# Patient Record
Sex: MALE | Race: WHITE | NOT HISPANIC OR LATINO | Employment: OTHER | URBAN - METROPOLITAN AREA
[De-identification: names, ages, dates, MRNs, and addresses within clinical notes are randomized per-mention and may not be internally consistent; named-entity substitution may affect disease eponyms.]

---

## 2017-01-01 ENCOUNTER — TELEPHONE (OUTPATIENT)
Dept: ONCOLOGY | Facility: CLINIC | Age: 77
End: 2017-01-01

## 2017-01-01 ENCOUNTER — APPOINTMENT (OUTPATIENT)
Dept: GENERAL RADIOLOGY | Facility: HOSPITAL | Age: 77
End: 2017-01-01

## 2017-01-01 ENCOUNTER — TELEPHONE (OUTPATIENT)
Dept: PULMONOLOGY | Facility: CLINIC | Age: 77
End: 2017-01-01

## 2017-01-01 ENCOUNTER — OFFICE VISIT (OUTPATIENT)
Dept: ONCOLOGY | Facility: CLINIC | Age: 77
End: 2017-01-01

## 2017-01-01 ENCOUNTER — RESULTS ENCOUNTER (OUTPATIENT)
Dept: ONCOLOGY | Facility: CLINIC | Age: 77
End: 2017-01-01

## 2017-01-01 ENCOUNTER — APPOINTMENT (OUTPATIENT)
Dept: CT IMAGING | Facility: HOSPITAL | Age: 77
End: 2017-01-01

## 2017-01-01 ENCOUNTER — APPOINTMENT (OUTPATIENT)
Dept: CARDIOLOGY | Facility: HOSPITAL | Age: 77
End: 2017-01-01
Attending: HOSPITALIST

## 2017-01-01 ENCOUNTER — HOSPITAL ENCOUNTER (OUTPATIENT)
Dept: CT IMAGING | Facility: HOSPITAL | Age: 77
Discharge: HOME | End: 2017-06-02
Attending: INTERNAL MEDICINE | Admitting: INTERNAL MEDICINE

## 2017-01-01 ENCOUNTER — HOSPITAL ENCOUNTER (OUTPATIENT)
Dept: CARDIOLOGY | Facility: HOSPITAL | Age: 77
Discharge: HOME | End: 2017-05-30
Admitting: PHYSICIAN ASSISTANT

## 2017-01-01 ENCOUNTER — INFUSION (OUTPATIENT)
Dept: ONCOLOGY | Facility: HOSPITAL | Age: 77
End: 2017-01-01

## 2017-01-01 ENCOUNTER — LAB (OUTPATIENT)
Dept: LAB | Facility: HOSPITAL | Age: 77
End: 2017-01-01

## 2017-01-01 ENCOUNTER — OFFICE VISIT (OUTPATIENT)
Dept: CARDIAC SURGERY | Facility: CLINIC | Age: 77
End: 2017-01-01

## 2017-01-01 ENCOUNTER — APPOINTMENT (OUTPATIENT)
Dept: CARDIOLOGY | Facility: HOSPITAL | Age: 77
End: 2017-01-01
Attending: INTERNAL MEDICINE

## 2017-01-01 ENCOUNTER — OFFICE VISIT (OUTPATIENT)
Dept: PULMONOLOGY | Facility: CLINIC | Age: 77
End: 2017-01-01

## 2017-01-01 ENCOUNTER — HOSPITAL ENCOUNTER (INPATIENT)
Facility: HOSPITAL | Age: 77
LOS: 5 days | Discharge: HOME | End: 2017-04-24
Attending: HOSPITALIST | Admitting: HOSPITALIST

## 2017-01-01 ENCOUNTER — DOCUMENTATION (OUTPATIENT)
Dept: ONCOLOGY | Facility: CLINIC | Age: 77
End: 2017-01-01

## 2017-01-01 VITALS
WEIGHT: 123 LBS | BODY MASS INDEX: 16.66 KG/M2 | SYSTOLIC BLOOD PRESSURE: 90 MMHG | RESPIRATION RATE: 24 BRPM | TEMPERATURE: 98.1 F | DIASTOLIC BLOOD PRESSURE: 56 MMHG | OXYGEN SATURATION: 79 % | HEART RATE: 85 BPM | HEIGHT: 72 IN

## 2017-01-01 VITALS
HEIGHT: 72 IN | DIASTOLIC BLOOD PRESSURE: 54 MMHG | WEIGHT: 123 LBS | SYSTOLIC BLOOD PRESSURE: 100 MMHG | RESPIRATION RATE: 16 BRPM | OXYGEN SATURATION: 97 % | TEMPERATURE: 97.9 F | BODY MASS INDEX: 16.66 KG/M2 | HEART RATE: 76 BPM

## 2017-01-01 VITALS
OXYGEN SATURATION: 77 % | DIASTOLIC BLOOD PRESSURE: 42 MMHG | BODY MASS INDEX: 17.61 KG/M2 | HEART RATE: 104 BPM | RESPIRATION RATE: 22 BRPM | WEIGHT: 130 LBS | HEIGHT: 72 IN | TEMPERATURE: 98.1 F | SYSTOLIC BLOOD PRESSURE: 91 MMHG

## 2017-01-01 VITALS
HEIGHT: 72 IN | DIASTOLIC BLOOD PRESSURE: 66 MMHG | WEIGHT: 130.07 LBS | OXYGEN SATURATION: 97 % | TEMPERATURE: 98.1 F | BODY MASS INDEX: 17.62 KG/M2 | HEART RATE: 84 BPM | RESPIRATION RATE: 22 BRPM | SYSTOLIC BLOOD PRESSURE: 94 MMHG

## 2017-01-01 VITALS
OXYGEN SATURATION: 89 % | WEIGHT: 127 LBS | BODY MASS INDEX: 17.2 KG/M2 | HEIGHT: 72 IN | SYSTOLIC BLOOD PRESSURE: 100 MMHG | DIASTOLIC BLOOD PRESSURE: 70 MMHG | HEART RATE: 75 BPM | TEMPERATURE: 97.9 F

## 2017-01-01 VITALS
DIASTOLIC BLOOD PRESSURE: 78 MMHG | WEIGHT: 127.6 LBS | OXYGEN SATURATION: 98 % | HEART RATE: 90 BPM | TEMPERATURE: 97.5 F | RESPIRATION RATE: 18 BRPM | HEIGHT: 71 IN | BODY MASS INDEX: 17.86 KG/M2 | SYSTOLIC BLOOD PRESSURE: 118 MMHG

## 2017-01-01 VITALS
RESPIRATION RATE: 18 BRPM | SYSTOLIC BLOOD PRESSURE: 75 MMHG | TEMPERATURE: 98.4 F | DIASTOLIC BLOOD PRESSURE: 40 MMHG | BODY MASS INDEX: 16.8 KG/M2 | HEIGHT: 72 IN | HEART RATE: 77 BPM | WEIGHT: 124 LBS

## 2017-01-01 VITALS
HEART RATE: 86 BPM | TEMPERATURE: 97.5 F | SYSTOLIC BLOOD PRESSURE: 92 MMHG | OXYGEN SATURATION: 85 % | WEIGHT: 122 LBS | DIASTOLIC BLOOD PRESSURE: 53 MMHG | HEIGHT: 72 IN | BODY MASS INDEX: 16.52 KG/M2

## 2017-01-01 VITALS
DIASTOLIC BLOOD PRESSURE: 52 MMHG | HEIGHT: 72 IN | WEIGHT: 127 LBS | RESPIRATION RATE: 20 BRPM | TEMPERATURE: 98 F | BODY MASS INDEX: 17.2 KG/M2 | HEART RATE: 80 BPM | SYSTOLIC BLOOD PRESSURE: 92 MMHG

## 2017-01-01 VITALS
OXYGEN SATURATION: 63 % | RESPIRATION RATE: 16 BRPM | HEART RATE: 84 BPM | HEIGHT: 72 IN | BODY MASS INDEX: 17.61 KG/M2 | DIASTOLIC BLOOD PRESSURE: 74 MMHG | WEIGHT: 130 LBS | TEMPERATURE: 97.8 F | SYSTOLIC BLOOD PRESSURE: 120 MMHG

## 2017-01-01 VITALS
OXYGEN SATURATION: 89 % | BODY MASS INDEX: 16.52 KG/M2 | DIASTOLIC BLOOD PRESSURE: 54 MMHG | HEIGHT: 72 IN | WEIGHT: 122 LBS | HEART RATE: 72 BPM | TEMPERATURE: 97.2 F | RESPIRATION RATE: 20 BRPM | SYSTOLIC BLOOD PRESSURE: 101 MMHG

## 2017-01-01 VITALS
HEIGHT: 72 IN | RESPIRATION RATE: 20 BRPM | HEART RATE: 83 BPM | SYSTOLIC BLOOD PRESSURE: 86 MMHG | BODY MASS INDEX: 17.61 KG/M2 | WEIGHT: 130 LBS | TEMPERATURE: 97.6 F | DIASTOLIC BLOOD PRESSURE: 46 MMHG

## 2017-01-01 VITALS
HEART RATE: 90 BPM | BODY MASS INDEX: 15.19 KG/M2 | DIASTOLIC BLOOD PRESSURE: 54 MMHG | SYSTOLIC BLOOD PRESSURE: 114 MMHG | WEIGHT: 112 LBS | RESPIRATION RATE: 16 BRPM | TEMPERATURE: 98 F

## 2017-01-01 VITALS — WEIGHT: 120 LBS | HEIGHT: 72 IN | BODY MASS INDEX: 16.25 KG/M2

## 2017-01-01 DIAGNOSIS — C93.10 CHRONIC MYELOMONOCYTIC LEUKEMIA NOT HAVING ACHIEVED REMISSION (HCC): Primary | ICD-10-CM

## 2017-01-01 DIAGNOSIS — D46.20 MYELODYSPLASIA, LOW GRADE (HCC): ICD-10-CM

## 2017-01-01 DIAGNOSIS — D46.20 MYELODYSPLASIA, LOW GRADE (HCC): Primary | ICD-10-CM

## 2017-01-01 DIAGNOSIS — R62.7 FTT (FAILURE TO THRIVE) IN ADULT: ICD-10-CM

## 2017-01-01 DIAGNOSIS — Z74.09 IMPAIRED MOBILITY AND ADLS: ICD-10-CM

## 2017-01-01 DIAGNOSIS — J43.2 CENTRILOBULAR EMPHYSEMA (HCC): Primary | ICD-10-CM

## 2017-01-01 DIAGNOSIS — J43.2 CENTRILOBULAR EMPHYSEMA (HCC): ICD-10-CM

## 2017-01-01 DIAGNOSIS — R09.02 HYPOXIA: ICD-10-CM

## 2017-01-01 DIAGNOSIS — C93.10 CHRONIC MYELOMONOCYTIC LEUKEMIA NOT HAVING ACHIEVED REMISSION (HCC): ICD-10-CM

## 2017-01-01 DIAGNOSIS — R06.02 SHORTNESS OF BREATH: ICD-10-CM

## 2017-01-01 DIAGNOSIS — Z74.09 IMPAIRED FUNCTIONAL MOBILITY, BALANCE, GAIT, AND ENDURANCE: Primary | ICD-10-CM

## 2017-01-01 DIAGNOSIS — I31.39 PERICARDIAL EFFUSION: Primary | ICD-10-CM

## 2017-01-01 DIAGNOSIS — R09.02 HYPOXIA: Primary | ICD-10-CM

## 2017-01-01 DIAGNOSIS — I31.4 PERICARDIAL EFFUSION WITH CARDIAC TAMPONADE: ICD-10-CM

## 2017-01-01 DIAGNOSIS — Z72.0 TOBACCO ABUSE: ICD-10-CM

## 2017-01-01 DIAGNOSIS — I31.39 PERICARDIAL EFFUSION WITH CARDIAC TAMPONADE: ICD-10-CM

## 2017-01-01 DIAGNOSIS — C93.12 CHRONIC MYELOMONOCYTIC LEUKEMIA, IN RELAPSE (HCC): ICD-10-CM

## 2017-01-01 DIAGNOSIS — Z78.9 IMPAIRED MOBILITY AND ADLS: ICD-10-CM

## 2017-01-01 DIAGNOSIS — D50.0 IRON DEFICIENCY ANEMIA DUE TO CHRONIC BLOOD LOSS: ICD-10-CM

## 2017-01-01 DIAGNOSIS — I31.39 PERICARDIAL EFFUSION: ICD-10-CM

## 2017-01-01 DIAGNOSIS — D64.9 ANEMIA, UNSPECIFIED TYPE: ICD-10-CM

## 2017-01-01 DIAGNOSIS — Z87.891 PERSONAL HISTORY OF SMOKING: Primary | ICD-10-CM

## 2017-01-01 DIAGNOSIS — D69.6 THROMBOCYTOPENIA (HCC): Primary | ICD-10-CM

## 2017-01-01 DIAGNOSIS — I31.39 PERICARDIAL EFFUSION WITH CARDIAC TAMPONADE: Primary | ICD-10-CM

## 2017-01-01 DIAGNOSIS — Z87.891 PERSONAL HISTORY OF TOBACCO USE, PRESENTING HAZARDS TO HEALTH: Primary | ICD-10-CM

## 2017-01-01 DIAGNOSIS — D64.9 ANEMIA, UNSPECIFIED TYPE: Primary | ICD-10-CM

## 2017-01-01 DIAGNOSIS — Z87.891 PERSONAL HISTORY OF TOBACCO USE, PRESENTING HAZARDS TO HEALTH: ICD-10-CM

## 2017-01-01 DIAGNOSIS — R53.82 CHRONIC FATIGUE: ICD-10-CM

## 2017-01-01 DIAGNOSIS — I31.4 PERICARDIAL EFFUSION WITH CARDIAC TAMPONADE: Primary | ICD-10-CM

## 2017-01-01 DIAGNOSIS — C93.11 CHRONIC MYELOMONOCYTIC LEUKEMIA IN REMISSION (HCC): Primary | ICD-10-CM

## 2017-01-01 LAB
ABO + RH BLD: NORMAL
ABO + RH BLD: NORMAL
ABO GROUP BLD: NORMAL
ABO GROUP BLD: NORMAL
ALBUMIN SERPL-MCNC: 3.5 G/DL (ref 3.2–4.8)
ALBUMIN SERPL-MCNC: 3.8 G/DL (ref 3.2–4.8)
ALBUMIN/GLOB SERPL: 1.1 G/DL (ref 1.5–2.5)
ALBUMIN/GLOB SERPL: 1.2 G/DL (ref 1.5–2.5)
ALP SERPL-CCNC: 58 U/L (ref 25–100)
ALP SERPL-CCNC: 66 U/L (ref 25–100)
ALT SERPL W P-5'-P-CCNC: 25 U/L (ref 7–40)
ALT SERPL W P-5'-P-CCNC: 36 U/L (ref 7–40)
ANION GAP SERPL CALCULATED.3IONS-SCNC: 14 MMOL/L (ref 3–11)
ANION GAP SERPL CALCULATED.3IONS-SCNC: 4 MMOL/L (ref 3–11)
ANION GAP SERPL CALCULATED.3IONS-SCNC: 4 MMOL/L (ref 3–11)
ANION GAP SERPL CALCULATED.3IONS-SCNC: 5 MMOL/L (ref 3–11)
APTT PPP: 35 SECONDS (ref 24–31)
AST SERPL-CCNC: 38 U/L (ref 0–33)
AST SERPL-CCNC: 48 U/L (ref 0–33)
BASOPHILS # BLD AUTO: 0 X10E3/UL (ref 0–0.2)
BASOPHILS # BLD AUTO: 0.2 X10E3/UL (ref 0–0.2)
BASOPHILS # BLD AUTO: 0.4 X10E3/UL (ref 0–0.2)
BASOPHILS # BLD MANUAL: 0 10*3/MM3 (ref 0–0.2)
BASOPHILS NFR BLD AUTO: 0 %
BASOPHILS NFR BLD AUTO: 0 % (ref 0–1)
BASOPHILS NFR BLD AUTO: 1 %
BASOPHILS NFR BLD AUTO: 2 %
BH BB BLOOD EXPIRATION DATE: NORMAL
BH BB BLOOD EXPIRATION DATE: NORMAL
BH BB BLOOD TYPE BARCODE: 7300
BH BB BLOOD TYPE BARCODE: 7300
BH BB DISPENSE STATUS: NORMAL
BH BB DISPENSE STATUS: NORMAL
BH BB PRODUCT CODE: NORMAL
BH BB PRODUCT CODE: NORMAL
BH BB UNIT NUMBER: NORMAL
BH BB UNIT NUMBER: NORMAL
BH CV ECHO MEAS - AI DEC SLOPE: 284 CM/SEC^2
BH CV ECHO MEAS - AI DEC SLOPE: 321.1 CM/SEC^2
BH CV ECHO MEAS - AI MAX PG: 57.6 MMHG
BH CV ECHO MEAS - AI MAX PG: 58.7 MMHG
BH CV ECHO MEAS - AI MAX VEL: 379 CM/SEC
BH CV ECHO MEAS - AI MAX VEL: 382.9 CM/SEC
BH CV ECHO MEAS - AI P1/2T: 349.3 MSEC
BH CV ECHO MEAS - AI P1/2T: 390.9 MSEC
BH CV ECHO MEAS - AO ROOT AREA (BSA CORRECTED): 1.6
BH CV ECHO MEAS - AO ROOT AREA (BSA CORRECTED): 2
BH CV ECHO MEAS - AO ROOT AREA: 6.6 CM^2
BH CV ECHO MEAS - AO ROOT AREA: 9.7 CM^2
BH CV ECHO MEAS - AO ROOT DIAM: 2.9 CM
BH CV ECHO MEAS - AO ROOT DIAM: 3.5 CM
BH CV ECHO MEAS - BSA(HAYCOCK): 1.7 M^2
BH CV ECHO MEAS - BSA: 1.8 M^2
BH CV ECHO MEAS - BZI_BMI: 17.5 KILOGRAMS/M^2
BH CV ECHO MEAS - BZI_BMI: 17.6 KILOGRAMS/M^2
BH CV ECHO MEAS - BZI_BMI: 17.6 KILOGRAMS/M^2
BH CV ECHO MEAS - BZI_METRIC_HEIGHT: 182.9 CM
BH CV ECHO MEAS - BZI_METRIC_WEIGHT: 58.5 KG
BH CV ECHO MEAS - BZI_METRIC_WEIGHT: 59 KG
BH CV ECHO MEAS - BZI_METRIC_WEIGHT: 59 KG
BH CV ECHO MEAS - CONTRAST EF (2CH): 47.1 ML/M^2
BH CV ECHO MEAS - CONTRAST EF (2CH): 52.1 ML/M^2
BH CV ECHO MEAS - CONTRAST EF 4CH: 50 ML/M^2
BH CV ECHO MEAS - CONTRAST EF 4CH: 64.8 ML/M^2
BH CV ECHO MEAS - EDV(CUBED): 71.1 ML
BH CV ECHO MEAS - EDV(CUBED): 95.4 ML
BH CV ECHO MEAS - EDV(MOD-SP2): 87 ML
BH CV ECHO MEAS - EDV(MOD-SP2): 94 ML
BH CV ECHO MEAS - EDV(MOD-SP4): 105 ML
BH CV ECHO MEAS - EDV(MOD-SP4): 96 ML
BH CV ECHO MEAS - EDV(TEICH): 76.1 ML
BH CV ECHO MEAS - EDV(TEICH): 95.9 ML
BH CV ECHO MEAS - EF(CUBED): 62.1 %
BH CV ECHO MEAS - EF(CUBED): 70 %
BH CV ECHO MEAS - EF(MOD-SP2): 47.1 %
BH CV ECHO MEAS - EF(MOD-SP2): 52.1 %
BH CV ECHO MEAS - EF(MOD-SP4): 50 %
BH CV ECHO MEAS - EF(MOD-SP4): 64 %
BH CV ECHO MEAS - EF(TEICH): 54.1 %
BH CV ECHO MEAS - EF(TEICH): 61.7 %
BH CV ECHO MEAS - ESV(CUBED): 26.9 ML
BH CV ECHO MEAS - ESV(CUBED): 28.7 ML
BH CV ECHO MEAS - ESV(MOD-SP2): 45 ML
BH CV ECHO MEAS - ESV(MOD-SP2): 46 ML
BH CV ECHO MEAS - ESV(MOD-SP4): 37 ML
BH CV ECHO MEAS - ESV(MOD-SP4): 48 ML
BH CV ECHO MEAS - ESV(TEICH): 34.9 ML
BH CV ECHO MEAS - ESV(TEICH): 36.7 ML
BH CV ECHO MEAS - FS: 27.7 %
BH CV ECHO MEAS - FS: 33 %
BH CV ECHO MEAS - IVS/LVPW: 0.9
BH CV ECHO MEAS - IVS/LVPW: 1.1
BH CV ECHO MEAS - IVSD: 0.84 CM
BH CV ECHO MEAS - IVSD: 1 CM
BH CV ECHO MEAS - LA DIMENSION: 4.2 CM
BH CV ECHO MEAS - LA DIMENSION: 4.3 CM
BH CV ECHO MEAS - LA/AO: 1.2
BH CV ECHO MEAS - LA/AO: 1.4
BH CV ECHO MEAS - LAT PEAK E' VEL: 12.3 CM/SEC
BH CV ECHO MEAS - LV DIASTOLIC VOL/BSA (35-75): 54.1 ML/M^2
BH CV ECHO MEAS - LV DIASTOLIC VOL/BSA (35-75): 59.4 ML/M^2
BH CV ECHO MEAS - LV MASS(C)D: 113.4 GRAMS
BH CV ECHO MEAS - LV MASS(C)D: 146 GRAMS
BH CV ECHO MEAS - LV MASS(C)DI: 63.9 GRAMS/M^2
BH CV ECHO MEAS - LV MASS(C)DI: 82.6 GRAMS/M^2
BH CV ECHO MEAS - LV MAX PG: 3.3 MMHG
BH CV ECHO MEAS - LV MEAN PG: 1.3 MMHG
BH CV ECHO MEAS - LV SYSTOLIC VOL/BSA (12-30): 20.9 ML/M^2
BH CV ECHO MEAS - LV SYSTOLIC VOL/BSA (12-30): 27.1 ML/M^2
BH CV ECHO MEAS - LV V1 MAX: 90.3 CM/SEC
BH CV ECHO MEAS - LV V1 MEAN: 50.9 CM/SEC
BH CV ECHO MEAS - LV V1 VTI: 18.5 CM
BH CV ECHO MEAS - LVIDD: 4.1 CM
BH CV ECHO MEAS - LVIDD: 4.6 CM
BH CV ECHO MEAS - LVIDS: 3 CM
BH CV ECHO MEAS - LVIDS: 3.1 CM
BH CV ECHO MEAS - LVLD AP2: 6.6 CM
BH CV ECHO MEAS - LVLD AP2: 7.3 CM
BH CV ECHO MEAS - LVLD AP4: 7.1 CM
BH CV ECHO MEAS - LVLD AP4: 7.4 CM
BH CV ECHO MEAS - LVLS AP2: 6.6 CM
BH CV ECHO MEAS - LVLS AP2: 6.8 CM
BH CV ECHO MEAS - LVLS AP4: 6.1 CM
BH CV ECHO MEAS - LVLS AP4: 6.7 CM
BH CV ECHO MEAS - LVOT AREA (M): 3.5 CM^2
BH CV ECHO MEAS - LVOT AREA: 3.6 CM^2
BH CV ECHO MEAS - LVOT DIAM: 2.1 CM
BH CV ECHO MEAS - LVPWD: 0.93 CM
BH CV ECHO MEAS - LVPWD: 1 CM
BH CV ECHO MEAS - MED PEAK E' VEL: 5.9 CM/SEC
BH CV ECHO MEAS - MV A MAX VEL: 80 CM/SEC
BH CV ECHO MEAS - MV A MAX VEL: 82.9 CM/SEC
BH CV ECHO MEAS - MV DEC TIME: 0.18 SEC
BH CV ECHO MEAS - MV E MAX VEL: 96.7 CM/SEC
BH CV ECHO MEAS - MV E MAX VEL: 99.7 CM/SEC
BH CV ECHO MEAS - MV E/A: 1.2
BH CV ECHO MEAS - MV E/A: 1.2
BH CV ECHO MEAS - PA ACC SLOPE: 1007 CM/SEC^2
BH CV ECHO MEAS - PA ACC SLOPE: 694 CM/SEC^2
BH CV ECHO MEAS - PA ACC TIME: 0.1 SEC
BH CV ECHO MEAS - PA ACC TIME: 0.12 SEC
BH CV ECHO MEAS - PA PR(ACCEL): 23.5 MMHG
BH CV ECHO MEAS - PA PR(ACCEL): 33.8 MMHG
BH CV ECHO MEAS - PI END-D VEL: 137.1 CM/SEC
BH CV ECHO MEAS - RAP SYSTOLE: 3 MMHG
BH CV ECHO MEAS - RAP SYSTOLE: 8 MMHG
BH CV ECHO MEAS - RVDD: 3.3 CM
BH CV ECHO MEAS - RVDD: 3.3 CM
BH CV ECHO MEAS - RVSP: 39 MMHG
BH CV ECHO MEAS - RVSP: 48.4 MMHG
BH CV ECHO MEAS - SI(CUBED): 24.9 ML/M^2
BH CV ECHO MEAS - SI(CUBED): 37.8 ML/M^2
BH CV ECHO MEAS - SI(LVOT): 37.5 ML/M^2
BH CV ECHO MEAS - SI(MOD-SP2): 23.2 ML/M^2
BH CV ECHO MEAS - SI(MOD-SP2): 27.6 ML/M^2
BH CV ECHO MEAS - SI(MOD-SP4): 27.1 ML/M^2
BH CV ECHO MEAS - SI(MOD-SP4): 38.5 ML/M^2
BH CV ECHO MEAS - SI(TEICH): 23.2 ML/M^2
BH CV ECHO MEAS - SI(TEICH): 33.4 ML/M^2
BH CV ECHO MEAS - SV(CUBED): 44.2 ML
BH CV ECHO MEAS - SV(CUBED): 66.8 ML
BH CV ECHO MEAS - SV(LVOT): 66.6 ML
BH CV ECHO MEAS - SV(MOD-SP2): 41 ML
BH CV ECHO MEAS - SV(MOD-SP2): 49 ML
BH CV ECHO MEAS - SV(MOD-SP4): 48 ML
BH CV ECHO MEAS - SV(MOD-SP4): 68 ML
BH CV ECHO MEAS - SV(TEICH): 41.2 ML
BH CV ECHO MEAS - SV(TEICH): 59.1 ML
BH CV ECHO MEAS - TAPSE (>1.6): 1.1 CM2
BH CV ECHO MEAS - TR MAX VEL: 299.3 CM/SEC
BH CV ECHO MEAS - TR MAX VEL: 317.6 CM/SEC
BH CV VAS BP RIGHT ARM: NORMAL MMHG
BH CV XLRA - RV BASE: 3.9 CM
BH CV XLRA - RV BASE: 4 CM
BH CV XLRA - RV LENGTH: 5.9 CM
BH CV XLRA - RV LENGTH: 7.2 CM
BH CV XLRA - RV MID: 3.4 CM
BH CV XLRA - RV MID: 3.4 CM
BILIRUB CONJ SERPL-MCNC: 0.2 MG/DL (ref 0–0.2)
BILIRUB SERPL-MCNC: 0.5 MG/DL (ref 0.3–1.2)
BILIRUB SERPL-MCNC: 0.8 MG/DL (ref 0.3–1.2)
BLASTS NFR BLD MANUAL: 1 % (ref 0–0)
BLASTS NFR BLD MANUAL: 2 % (ref 0–0)
BLASTS NFR BLD: 1 %
BLASTS NFR BLD: 2 %
BLASTS NFR BLD: 3 %
BLASTS NFR BLD: 4 %
BLD GP AB SCN SERPL QL: NEGATIVE
BNP SERPL-MCNC: 441 PG/ML (ref 0–100)
BUN BLD-MCNC: 17 MG/DL (ref 9–23)
BUN BLD-MCNC: 17 MG/DL (ref 9–23)
BUN BLD-MCNC: 22 MG/DL (ref 9–23)
BUN BLD-MCNC: 22 MG/DL (ref 9–23)
BUN/CREAT SERPL: 12.1 (ref 7–25)
BUN/CREAT SERPL: 13.8 (ref 7–25)
BUN/CREAT SERPL: 14.2 (ref 7–25)
BUN/CREAT SERPL: 15.7 (ref 7–25)
CALCIUM SPEC-SCNC: 8.8 MG/DL (ref 8.7–10.4)
CALCIUM SPEC-SCNC: 8.9 MG/DL (ref 8.7–10.4)
CALCIUM SPEC-SCNC: 8.9 MG/DL (ref 8.7–10.4)
CALCIUM SPEC-SCNC: 9.4 MG/DL (ref 8.7–10.4)
CHLORIDE SERPL-SCNC: 109 MMOL/L (ref 99–109)
CHLORIDE SERPL-SCNC: 109 MMOL/L (ref 99–109)
CHLORIDE SERPL-SCNC: 110 MMOL/L (ref 99–109)
CHLORIDE SERPL-SCNC: 111 MMOL/L (ref 99–109)
CO2 SERPL-SCNC: 15 MMOL/L (ref 20–31)
CO2 SERPL-SCNC: 23 MMOL/L (ref 20–31)
CO2 SERPL-SCNC: 23 MMOL/L (ref 20–31)
CO2 SERPL-SCNC: 24 MMOL/L (ref 20–31)
CORTIS SERPL-MCNC: 11.6 MCG/DL
CREAT BLD-MCNC: 1.2 MG/DL (ref 0.6–1.3)
CREAT BLD-MCNC: 1.4 MG/DL (ref 0.6–1.3)
CREAT BLD-MCNC: 1.4 MG/DL (ref 0.6–1.3)
CREAT BLD-MCNC: 1.6 MG/DL (ref 0.6–1.3)
CROSSMATCH INTERPRETATION: NORMAL
CROSSMATCH INTERPRETATION: NORMAL
CYTOLOGIST CVX/VAG CYTO: NORMAL
D DIMER PPP FEU-MCNC: 1.45 MG/L (FEU) (ref 0–0.5)
D-LACTATE SERPL-SCNC: 1.4 MMOL/L (ref 0.5–2)
DAT POLY-SP REAG RBC QL: POSITIVE
DEPRECATED RDW RBC AUTO: 62.1 FL (ref 37–54)
DEPRECATED RDW RBC AUTO: 64.9 FL (ref 37–54)
DEPRECATED RDW RBC AUTO: 66.3 FL (ref 37–54)
DEPRECATED RDW RBC AUTO: 66.9 FL (ref 37–54)
DEPRECATED RDW RBC AUTO: 69.9 FL (ref 37–54)
DEPRECATED RDW RBC AUTO: 73.2 FL (ref 37–54)
EOSINOPHIL # BLD AUTO: 0 X10E3/UL (ref 0–0.4)
EOSINOPHIL # BLD AUTO: 0.1 X10E3/UL (ref 0–0.4)
EOSINOPHIL # BLD AUTO: 1.1 X10E3/UL (ref 0–0.4)
EOSINOPHIL # BLD MANUAL: 0 10*3/MM3 (ref 0.1–0.3)
EOSINOPHIL # BLD MANUAL: 0.35 10*3/MM3 (ref 0.1–0.3)
EOSINOPHIL NFR BLD AUTO: 0 %
EOSINOPHIL NFR BLD AUTO: 1 %
EOSINOPHIL NFR BLD AUTO: 2 %
EOSINOPHIL NFR BLD MANUAL: 0 % (ref 0–3)
EOSINOPHIL NFR BLD MANUAL: 1 % (ref 0–3)
EPO SERPL-ACNC: 7.2 MIU/ML (ref 2.6–18.5)
ERYTHROCYTE [DISTWIDTH] IN BLOOD BY AUTOMATED COUNT: 17.9 % (ref 12.3–15.4)
ERYTHROCYTE [DISTWIDTH] IN BLOOD BY AUTOMATED COUNT: 18 % (ref 12.3–15.4)
ERYTHROCYTE [DISTWIDTH] IN BLOOD BY AUTOMATED COUNT: 18.2 % (ref 12.3–15.4)
ERYTHROCYTE [DISTWIDTH] IN BLOOD BY AUTOMATED COUNT: 18.2 % (ref 12.3–15.4)
ERYTHROCYTE [DISTWIDTH] IN BLOOD BY AUTOMATED COUNT: 18.4 % (ref 12.3–15.4)
ERYTHROCYTE [DISTWIDTH] IN BLOOD BY AUTOMATED COUNT: 18.5 % (ref 12.3–15.4)
ERYTHROCYTE [DISTWIDTH] IN BLOOD BY AUTOMATED COUNT: 19 % (ref 12.3–15.4)
ERYTHROCYTE [DISTWIDTH] IN BLOOD BY AUTOMATED COUNT: 19.5 % (ref 11.3–14.5)
ERYTHROCYTE [DISTWIDTH] IN BLOOD BY AUTOMATED COUNT: 19.6 % (ref 11.3–14.5)
ERYTHROCYTE [DISTWIDTH] IN BLOOD BY AUTOMATED COUNT: 19.8 % (ref 11.3–14.5)
ERYTHROCYTE [DISTWIDTH] IN BLOOD BY AUTOMATED COUNT: 20.2 % (ref 11.3–14.5)
ERYTHROCYTE [DISTWIDTH] IN BLOOD BY AUTOMATED COUNT: 21 % (ref 12.3–15.4)
ERYTHROCYTE [DISTWIDTH] IN BLOOD BY AUTOMATED COUNT: 21.2 % (ref 11.3–14.5)
ERYTHROCYTE [DISTWIDTH] IN BLOOD BY AUTOMATED COUNT: 21.4 % (ref 12.3–15.4)
ERYTHROCYTE [DISTWIDTH] IN BLOOD BY AUTOMATED COUNT: 21.7 % (ref 11.3–14.5)
FERRITIN SERPL-MCNC: 130 NG/ML (ref 22–322)
FOLATE SERPL-MCNC: 7.26 NG/ML (ref 3.2–20)
GFR SERPL CREATININE-BSD FRML MDRD: 42 ML/MIN/1.73
GFR SERPL CREATININE-BSD FRML MDRD: 49 ML/MIN/1.73
GFR SERPL CREATININE-BSD FRML MDRD: 49 ML/MIN/1.73
GFR SERPL CREATININE-BSD FRML MDRD: 59 ML/MIN/1.73
GLOBULIN UR ELPH-MCNC: 3.1 GM/DL
GLOBULIN UR ELPH-MCNC: 3.3 GM/DL
GLUCOSE BLD-MCNC: 162 MG/DL (ref 70–100)
GLUCOSE BLD-MCNC: 91 MG/DL (ref 70–100)
GLUCOSE BLD-MCNC: 94 MG/DL (ref 70–100)
GLUCOSE BLD-MCNC: 98 MG/DL (ref 70–100)
HAPTOGLOB SERPL-MCNC: 46 MG/DL (ref 34–200)
HCT VFR BLD AUTO: 22.1 % (ref 38.9–50.9)
HCT VFR BLD AUTO: 23.6 % (ref 38.9–50.9)
HCT VFR BLD AUTO: 24.9 % (ref 37.5–51)
HCT VFR BLD AUTO: 25 % (ref 37.5–51)
HCT VFR BLD AUTO: 25.4 % (ref 37.5–51)
HCT VFR BLD AUTO: 28.1 % (ref 37.5–51)
HCT VFR BLD AUTO: 28.2 % (ref 38.9–50.9)
HCT VFR BLD AUTO: 28.6 % (ref 37.5–51)
HCT VFR BLD AUTO: 28.7 % (ref 37.5–51)
HCT VFR BLD AUTO: 28.9 % (ref 37.5–51)
HCT VFR BLD AUTO: 29 % (ref 37.5–51)
HCT VFR BLD AUTO: 29.1 % (ref 38.9–50.9)
HCT VFR BLD AUTO: 30.8 % (ref 37.5–51)
HCT VFR BLD AUTO: 31.8 % (ref 38.9–50.9)
HCT VFR BLD AUTO: 32.9 % (ref 38.9–50.9)
HCT VFR BLD AUTO: 33.3 % (ref 38.9–50.9)
HGB BLD-MCNC: 6.4 G/DL (ref 13.1–17.5)
HGB BLD-MCNC: 6.6 G/DL (ref 13.1–17.5)
HGB BLD-MCNC: 7 G/DL (ref 12.6–17.7)
HGB BLD-MCNC: 7.2 G/DL (ref 12.6–17.7)
HGB BLD-MCNC: 7.5 G/DL (ref 12.6–17.7)
HGB BLD-MCNC: 7.9 G/DL (ref 12.6–17.7)
HGB BLD-MCNC: 8 G/DL (ref 12.6–17.7)
HGB BLD-MCNC: 8 G/DL (ref 12.6–17.7)
HGB BLD-MCNC: 8.5 G/DL (ref 13.1–17.5)
HGB BLD-MCNC: 8.6 G/DL (ref 12.6–17.7)
HGB BLD-MCNC: 8.9 G/DL (ref 12.6–17.7)
HGB BLD-MCNC: 8.9 G/DL (ref 13.1–17.5)
HGB BLD-MCNC: 9.5 G/DL (ref 12.6–17.7)
HGB BLD-MCNC: 9.6 G/DL (ref 13.1–17.5)
HGB BLD-MCNC: 9.7 G/DL (ref 13.1–17.5)
HGB BLD-MCNC: 9.7 G/DL (ref 13.1–17.5)
HYPOCHROMIA BLD QL: ABNORMAL
HYPOCHROMIA BLD QL: ABNORMAL
IMM GRANULOCYTES # BLD: 0 X10E3/UL (ref 0–0.1)
IMM GRANULOCYTES NFR BLD: 1 %
IMMATURE CELLS: (no result)
INR PPP: 1.17
IRON 24H UR-MRATE: 53 MCG/DL (ref 50–175)
IRON SATN MFR SERPL: 26 % (ref 20–50)
LAB AP ASPIRATE SMEAR: NORMAL
LAB AP CASE REPORT: NORMAL
LAB AP CBC AND DIFFERENTIAL: NORMAL
LAB AP CLINICAL INFORMATION: NORMAL
LAB AP CLOT SECTION: NORMAL
LAB AP CORE BIOPSY: NORMAL
LAB AP DIAGNOSIS COMMENT: NORMAL
LAB AP FLOW CYTOMETRY REPORT,ADDENDUM: NORMAL
LAB AP FLOW CYTOMETRY SUMMARY: NORMAL
LAB AP SPECIAL STAINS: NORMAL
LARGE PLATELETS: ABNORMAL
LDH SERPL-CCNC: 288 U/L (ref 120–246)
LEFT ATRIUM VOLUME INDEX: 30.5 ML/M2
LEFT ATRIUM VOLUME INDEX: 34 ML/M2
LV EF 2D ECHO EST: 60 %
LV EF 2D ECHO EST: 65 %
LYMPHOCYTES # BLD AUTO: 2.4 X10E3/UL (ref 0.7–3.1)
LYMPHOCYTES # BLD AUTO: 2.4 X10E3/UL (ref 0.7–3.1)
LYMPHOCYTES # BLD AUTO: 3.7 X10E3/UL (ref 0.7–3.1)
LYMPHOCYTES # BLD AUTO: 4.1 X10E3/UL (ref 0.7–3.1)
LYMPHOCYTES # BLD AUTO: 5.9 X10E3/UL (ref 0.7–3.1)
LYMPHOCYTES # BLD AUTO: 6.5 X10E3/UL (ref 0.7–3.1)
LYMPHOCYTES # BLD AUTO: 6.7 X10E3/UL (ref 0.7–3.1)
LYMPHOCYTES # BLD AUTO: 7.2 X10E3/UL (ref 0.7–3.1)
LYMPHOCYTES # BLD AUTO: 9.8 X10E3/UL (ref 0.7–3.1)
LYMPHOCYTES # BLD MANUAL: 10.38 10*3/MM3 (ref 0.6–4.8)
LYMPHOCYTES # BLD MANUAL: 13.5 10*3/MM3 (ref 0.6–4.8)
LYMPHOCYTES # BLD MANUAL: 2.76 10*3/MM3 (ref 0.6–4.8)
LYMPHOCYTES # BLD MANUAL: 2.8 10*3/MM3 (ref 0.6–4.8)
LYMPHOCYTES # BLD MANUAL: 3.13 10*3/MM3 (ref 0.6–4.8)
LYMPHOCYTES # BLD MANUAL: 6.96 10*3/MM3 (ref 0.6–4.8)
LYMPHOCYTES NFR BLD AUTO: 13 %
LYMPHOCYTES NFR BLD AUTO: 15 %
LYMPHOCYTES NFR BLD AUTO: 16 %
LYMPHOCYTES NFR BLD AUTO: 18 %
LYMPHOCYTES NFR BLD AUTO: 20 %
LYMPHOCYTES NFR BLD AUTO: 30 %
LYMPHOCYTES NFR BLD AUTO: 37 %
LYMPHOCYTES NFR BLD AUTO: 38 %
LYMPHOCYTES NFR BLD AUTO: 9 %
LYMPHOCYTES NFR BLD MANUAL: 11 % (ref 24–44)
LYMPHOCYTES NFR BLD MANUAL: 11 % (ref 24–44)
LYMPHOCYTES NFR BLD MANUAL: 15 % (ref 0–12)
LYMPHOCYTES NFR BLD MANUAL: 30 % (ref 0–12)
LYMPHOCYTES NFR BLD MANUAL: 38 % (ref 24–44)
LYMPHOCYTES NFR BLD MANUAL: 39 % (ref 0–12)
LYMPHOCYTES NFR BLD MANUAL: 49 % (ref 24–44)
LYMPHOCYTES NFR BLD MANUAL: 5 % (ref 0–12)
LYMPHOCYTES NFR BLD MANUAL: 6 % (ref 0–12)
LYMPHOCYTES NFR BLD MANUAL: 7 % (ref 0–12)
LYMPHOCYTES NFR BLD MANUAL: 7 % (ref 24–44)
LYMPHOCYTES NFR BLD MANUAL: 8 % (ref 24–44)
Lab: NORMAL
MCH RBC QN AUTO: 25.6 PG (ref 26.6–33)
MCH RBC QN AUTO: 25.6 PG (ref 26.6–33)
MCH RBC QN AUTO: 25.9 PG (ref 26.6–33)
MCH RBC QN AUTO: 26 PG (ref 26.6–33)
MCH RBC QN AUTO: 26.1 PG (ref 26.6–33)
MCH RBC QN AUTO: 26.4 PG (ref 26.6–33)
MCH RBC QN AUTO: 26.9 PG (ref 26.6–33)
MCH RBC QN AUTO: 27.1 PG (ref 26.6–33)
MCH RBC QN AUTO: 27.2 PG (ref 27–31)
MCH RBC QN AUTO: 27.3 PG (ref 27–31)
MCH RBC QN AUTO: 27.6 PG (ref 27–31)
MCH RBC QN AUTO: 27.8 PG (ref 27–31)
MCH RBC QN AUTO: 27.9 PG (ref 26.6–33)
MCH RBC QN AUTO: 28 PG (ref 27–31)
MCH RBC QN AUTO: 28 PG (ref 27–31)
MCHC RBC AUTO-ENTMCNC: 27.9 G/DL (ref 31.5–35.7)
MCHC RBC AUTO-ENTMCNC: 28 G/DL (ref 31.5–35.7)
MCHC RBC AUTO-ENTMCNC: 28 G/DL (ref 31.5–35.7)
MCHC RBC AUTO-ENTMCNC: 28 G/DL (ref 32–36)
MCHC RBC AUTO-ENTMCNC: 28.1 G/DL (ref 31.5–35.7)
MCHC RBC AUTO-ENTMCNC: 28.9 G/DL (ref 31.5–35.7)
MCHC RBC AUTO-ENTMCNC: 29 G/DL (ref 32–36)
MCHC RBC AUTO-ENTMCNC: 29.1 G/DL (ref 32–36)
MCHC RBC AUTO-ENTMCNC: 29.5 G/DL (ref 31.5–35.7)
MCHC RBC AUTO-ENTMCNC: 29.5 G/DL (ref 32–36)
MCHC RBC AUTO-ENTMCNC: 29.7 G/DL (ref 31.5–35.7)
MCHC RBC AUTO-ENTMCNC: 30.2 G/DL (ref 32–36)
MCHC RBC AUTO-ENTMCNC: 30.6 G/DL (ref 32–36)
MCHC RBC AUTO-ENTMCNC: 30.8 G/DL (ref 31.5–35.7)
MCHC RBC AUTO-ENTMCNC: 30.8 G/DL (ref 31.5–35.7)
MCV RBC AUTO: 87 FL (ref 79–97)
MCV RBC AUTO: 87 FL (ref 79–97)
MCV RBC AUTO: 88 FL (ref 79–97)
MCV RBC AUTO: 89 FL (ref 79–97)
MCV RBC AUTO: 91 FL (ref 79–97)
MCV RBC AUTO: 91.5 FL (ref 80–99)
MCV RBC AUTO: 92 FL (ref 79–97)
MCV RBC AUTO: 92.7 FL (ref 80–99)
MCV RBC AUTO: 93 FL (ref 79–97)
MCV RBC AUTO: 93.5 FL (ref 80–99)
MCV RBC AUTO: 94 FL (ref 79–97)
MCV RBC AUTO: 94.3 FL (ref 80–99)
MCV RBC AUTO: 95.3 FL (ref 80–99)
MCV RBC AUTO: 97 FL (ref 79–97)
MCV RBC AUTO: 97.5 FL (ref 80–99)
METAMYELOCYTES NFR BLD MANUAL: 2 % (ref 0–0)
METAMYELOCYTES NFR BLD MANUAL: 20 % (ref 0–0)
METAMYELOCYTES NFR BLD MANUAL: 3 % (ref 0–0)
METAMYELOCYTES NFR BLD MANUAL: 7 % (ref 0–0)
METAMYELOCYTES NFR BLD MANUAL: 7 % (ref 0–0)
METAMYELOCYTES NFR BLD MANUAL: 8 % (ref 0–0)
METAMYELOCYTES NFR BLD: 10 %
METAMYELOCYTES NFR BLD: 12 %
METAMYELOCYTES NFR BLD: 13 %
METAMYELOCYTES NFR BLD: 4 %
METAMYELOCYTES NFR BLD: 5 %
METAMYELOCYTES NFR BLD: 6 %
METAMYELOCYTES NFR BLD: 8 %
METAMYELOCYTES NFR BLD: 9 %
MONOCYTES # BLD AUTO: 1.1 X10E3/UL (ref 0.1–0.9)
MONOCYTES # BLD AUTO: 1.3 X10E3/UL (ref 0.1–0.9)
MONOCYTES # BLD AUTO: 1.64 10*3/MM3 (ref 0–1)
MONOCYTES # BLD AUTO: 1.93 10*3/MM3 (ref 0–1)
MONOCYTES # BLD AUTO: 11.5 X10E3/UL (ref 0.1–0.9)
MONOCYTES # BLD AUTO: 13.67 10*3/MM3 (ref 0–1)
MONOCYTES # BLD AUTO: 2.5 X10E3/UL (ref 0.1–0.9)
MONOCYTES # BLD AUTO: 3 X10E3/UL (ref 0.1–0.9)
MONOCYTES # BLD AUTO: 3.76 10*3/MM3 (ref 0–1)
MONOCYTES # BLD AUTO: 4.5 X10E3/UL (ref 0.1–0.9)
MONOCYTES # BLD AUTO: 4.8 X10E3/UL (ref 0.1–0.9)
MONOCYTES # BLD AUTO: 4.97 10*3/MM3 (ref 0–1)
MONOCYTES # BLD AUTO: 6.6 X10E3/UL (ref 0.1–0.9)
MONOCYTES # BLD AUTO: 8.54 10*3/MM3 (ref 0–1)
MONOCYTES # BLD AUTO: 9.8 X10E3/UL (ref 0.1–0.9)
MONOCYTES NFR BLD AUTO: 11 %
MONOCYTES NFR BLD AUTO: 12 %
MONOCYTES NFR BLD AUTO: 12 %
MONOCYTES NFR BLD AUTO: 13 %
MONOCYTES NFR BLD AUTO: 16 %
MONOCYTES NFR BLD AUTO: 17 %
MONOCYTES NFR BLD AUTO: 18 %
MONOCYTES NFR BLD AUTO: 21 %
MONOCYTES NFR BLD AUTO: 22 %
MORPHOLOGY BLD-IMP: (no result)
MYELOCYTES NFR BLD MANUAL: 1 % (ref 0–0)
MYELOCYTES NFR BLD MANUAL: 10 % (ref 0–0)
MYELOCYTES NFR BLD MANUAL: 4 % (ref 0–0)
MYELOCYTES NFR BLD MANUAL: 5 % (ref 0–0)
MYELOCYTES NFR BLD MANUAL: 5 % (ref 0–0)
MYELOCYTES NFR BLD MANUAL: 7 % (ref 0–0)
MYELOCYTES NFR BLD: 10 %
MYELOCYTES NFR BLD: 16 %
MYELOCYTES NFR BLD: 17 %
MYELOCYTES NFR BLD: 18 %
MYELOCYTES NFR BLD: 19 %
MYELOCYTES NFR BLD: 22 %
MYELOCYTES NFR BLD: 3 %
MYELOCYTES NFR BLD: 9 %
NEUTROPHILS # BLD AUTO: 10.38 10*3/MM3 (ref 1.5–8.3)
NEUTROPHILS # BLD AUTO: 11.3 10*3/MM3 (ref 1.5–8.3)
NEUTROPHILS # BLD AUTO: 12.61 10*3/MM3 (ref 1.5–8.3)
NEUTROPHILS # BLD AUTO: 12.81 10*3/MM3 (ref 1.5–8.3)
NEUTROPHILS # BLD AUTO: 13.7 X10E3/UL (ref 1.4–7)
NEUTROPHILS # BLD AUTO: 13.9 X10E3/UL (ref 1.4–7)
NEUTROPHILS # BLD AUTO: 16.54 10*3/MM3 (ref 1.5–8.3)
NEUTROPHILS # BLD AUTO: 19.5 X10E3/UL (ref 1.4–7)
NEUTROPHILS # BLD AUTO: 2 X10E3/UL (ref 1.4–7)
NEUTROPHILS # BLD AUTO: 2.4 X10E3/UL (ref 1.4–7)
NEUTROPHILS # BLD AUTO: 24.8 X10E3/UL (ref 1.4–7)
NEUTROPHILS # BLD AUTO: 31.7 X10E3/UL (ref 1.4–7)
NEUTROPHILS # BLD AUTO: 5.9 X10E3/UL (ref 1.4–7)
NEUTROPHILS # BLD AUTO: 57.64 10*3/MM3 (ref 1.5–8.3)
NEUTROPHILS # BLD AUTO: 8 X10E3/UL (ref 1.4–7)
NEUTROPHILS NFR BLD AUTO: 27 %
NEUTROPHILS NFR BLD AUTO: 29 %
NEUTROPHILS NFR BLD AUTO: 33 %
NEUTROPHILS NFR BLD AUTO: 36 %
NEUTROPHILS NFR BLD AUTO: 36 %
NEUTROPHILS NFR BLD AUTO: 38 %
NEUTROPHILS NFR BLD AUTO: 45 %
NEUTROPHILS NFR BLD AUTO: 45 %
NEUTROPHILS NFR BLD AUTO: 55 %
NEUTROPHILS NFR BLD MANUAL: 26 % (ref 41–71)
NEUTROPHILS NFR BLD MANUAL: 28 % (ref 41–71)
NEUTROPHILS NFR BLD MANUAL: 33 % (ref 41–71)
NEUTROPHILS NFR BLD MANUAL: 38 % (ref 41–71)
NEUTROPHILS NFR BLD MANUAL: 41 % (ref 41–71)
NEUTROPHILS NFR BLD MANUAL: 52 % (ref 41–71)
NEUTS BAND NFR BLD AUTO: 17 %
NEUTS BAND NFR BLD MANUAL: 10 % (ref 0–5)
NEUTS BAND NFR BLD MANUAL: 12 % (ref 0–5)
NEUTS BAND NFR BLD MANUAL: 13 % (ref 0–5)
NEUTS BAND NFR BLD MANUAL: 14 % (ref 0–5)
NEUTS BAND NFR BLD MANUAL: 17 % (ref 0–5)
NRBC BLD AUTO-RTO: 11 %
NRBC BLD AUTO-RTO: 12 %
NRBC BLD AUTO-RTO: 4 %
NRBC SPEC MANUAL: 1 /100 WBC (ref 0–0)
NRBC SPEC MANUAL: 1 /100 WBC (ref 0–0)
NRBC SPEC MANUAL: 2 /100 WBC (ref 0–0)
NRBC SPEC MANUAL: 6 /100 WBC (ref 0–0)
PATH INTERP BLD-IMP: NORMAL
PATH REPORT.FINAL DX SPEC: NORMAL
PLAT MORPH BLD: NORMAL
PLATELET # BLD AUTO: 117 X10E3/UL (ref 150–379)
PLATELET # BLD AUTO: 127 10*3/MM3 (ref 150–450)
PLATELET # BLD AUTO: 133 10*3/MM3 (ref 150–450)
PLATELET # BLD AUTO: 145 X10E3/UL (ref 150–379)
PLATELET # BLD AUTO: 162 10*3/MM3 (ref 150–450)
PLATELET # BLD AUTO: 167 10*3/MM3 (ref 150–450)
PLATELET # BLD AUTO: 168 10*3/MM3 (ref 150–450)
PLATELET # BLD AUTO: 209 10*3/MM3 (ref 150–450)
PLATELET # BLD AUTO: 56 X10E3/UL (ref 150–379)
PLATELET # BLD AUTO: 60 X10E3/UL (ref 150–379)
PLATELET # BLD AUTO: 61 X10E3/UL (ref 150–379)
PLATELET # BLD AUTO: 63 X10E3/UL (ref 150–379)
PLATELET # BLD AUTO: 68 X10E3/UL (ref 150–379)
PLATELET # BLD AUTO: 74 X10E3/UL (ref 150–379)
PLATELET # BLD AUTO: 75 X10E3/UL (ref 150–379)
PMV BLD AUTO: 12.8 FL (ref 6–12)
PMV BLD AUTO: 13 FL (ref 6–12)
PMV BLD AUTO: ABNORMAL FL (ref 6–12)
POLYCHROMASIA BLD QL SMEAR: ABNORMAL
POLYCHROMASIA BLD QL SMEAR: ABNORMAL
POTASSIUM BLD-SCNC: 3.9 MMOL/L (ref 3.5–5.5)
POTASSIUM BLD-SCNC: 4 MMOL/L (ref 3.5–5.5)
POTASSIUM BLD-SCNC: 4.2 MMOL/L (ref 3.5–5.5)
POTASSIUM BLD-SCNC: 4.2 MMOL/L (ref 3.5–5.5)
PROMYELOCYTES NFR BLD MANUAL: 4 % (ref 0–0)
PROMYELOCYTES NFR BLD: 1 %
PROT SERPL-MCNC: 6.6 G/DL (ref 5.7–8.2)
PROT SERPL-MCNC: 7.1 G/DL (ref 5.7–8.2)
PROTHROMBIN TIME: 12.8 SECONDS (ref 9.6–11.5)
RBC # BLD AUTO: 2.32 10*6/MM3 (ref 4.2–5.76)
RBC # BLD AUTO: 2.42 10*6/MM3 (ref 4.2–5.76)
RBC # BLD AUTO: 2.58 X10E6/UL (ref 4.14–5.8)
RBC # BLD AUTO: 2.69 X10E6/UL (ref 4.14–5.8)
RBC # BLD AUTO: 2.87 X10E6/UL (ref 4.14–5.8)
RBC # BLD AUTO: 2.99 X10E6/UL (ref 4.14–5.8)
RBC # BLD AUTO: 3.12 X10E6/UL (ref 4.14–5.8)
RBC # BLD AUTO: 3.13 X10E6/UL (ref 4.14–5.8)
RBC # BLD AUTO: 3.18 10*6/MM3 (ref 4.2–5.76)
RBC # BLD AUTO: 3.31 X10E6/UL (ref 4.14–5.8)
RBC # BLD AUTO: 3.32 X10E6/UL (ref 4.14–5.8)
RBC # BLD AUTO: 3.43 10*6/MM3 (ref 4.2–5.76)
RBC # BLD AUTO: 3.49 10*6/MM3 (ref 4.2–5.76)
RBC # BLD AUTO: 3.51 X10E6/UL (ref 4.14–5.8)
RBC # BLD AUTO: 3.56 10*6/MM3 (ref 4.2–5.76)
RBC MORPH BLD: NORMAL
RETICS/RBC NFR AUTO: 14.65 % (ref 0.5–1.5)
RH BLD: POSITIVE
RH BLD: POSITIVE
SCAN SLIDE: NORMAL
SODIUM BLD-SCNC: 137 MMOL/L (ref 132–146)
SODIUM BLD-SCNC: 140 MMOL/L (ref 132–146)
TESTOST FREE SERPL-MCNC: 4.9 PG/ML (ref 6.6–18.1)
TESTOST SERPL-MCNC: 116 NG/DL (ref 264–916)
TIBC SERPL-MCNC: 204 MCG/DL (ref 250–450)
TROPONIN I SERPL-MCNC: 0.02 NG/ML
TSH SERPL DL<=0.05 MIU/L-ACNC: 1.03 MIU/ML (ref 0.35–5.35)
UNIT  ABO: NORMAL
UNIT  ABO: NORMAL
UNIT  RH: NORMAL
UNIT  RH: NORMAL
VARIANT LYMPHS NFR BLD MANUAL: 3 % (ref 0–5)
VIT B12 BLD-MCNC: >2000 PG/ML (ref 211–911)
WBC # BLD AUTO: 20.5 X10E3/UL (ref 3.4–10.8)
WBC # BLD AUTO: 22.3 X10E3/UL (ref 3.4–10.8)
WBC # BLD AUTO: 24.9 X10E3/UL (ref 3.4–10.8)
WBC # BLD AUTO: 36.6 X10E3/UL (ref 3.4–10.8)
WBC # BLD AUTO: 54.2 X10E3/UL (ref 3.4–10.8)
WBC # BLD AUTO: 55.2 X10E3/UL (ref 3.4–10.8)
WBC # BLD AUTO: 6.2 X10E3/UL (ref 3.4–10.8)
WBC # BLD AUTO: 6.4 X10E3/UL (ref 3.4–10.8)
WBC # BLD AUTO: 72.1 X10E3/UL (ref 3.4–10.8)
WBC MORPH BLD: NORMAL
WBC NRBC COR # BLD: 25.06 10*3/MM3 (ref 3.5–10.8)
WBC NRBC COR # BLD: 27.31 10*3/MM3 (ref 3.5–10.8)
WBC NRBC COR # BLD: 27.55 10*3/MM3 (ref 3.5–10.8)
WBC NRBC COR # BLD: 28.46 10*3/MM3 (ref 3.5–10.8)
WBC NRBC COR # BLD: 35.04 10*3/MM3 (ref 3.5–10.8)
WBC NRBC COR # BLD: 99.38 10*3/MM3 (ref 3.5–10.8)

## 2017-01-01 PROCEDURE — 83880 ASSAY OF NATRIURETIC PEPTIDE: CPT | Performed by: PHYSICIAN ASSISTANT

## 2017-01-01 PROCEDURE — 82746 ASSAY OF FOLIC ACID SERUM: CPT | Performed by: HOSPITALIST

## 2017-01-01 PROCEDURE — 94760 N-INVAS EAR/PLS OXIMETRY 1: CPT

## 2017-01-01 PROCEDURE — 71020 CHG CHEST X-RAY 2 VW: CPT | Performed by: THORACIC SURGERY (CARDIOTHORACIC VASCULAR SURGERY)

## 2017-01-01 PROCEDURE — 93306 TTE W/DOPPLER COMPLETE: CPT

## 2017-01-01 PROCEDURE — 85025 COMPLETE CBC W/AUTO DIFF WBC: CPT | Performed by: PHYSICIAN ASSISTANT

## 2017-01-01 PROCEDURE — 71010 HC CHEST PA OR AP: CPT

## 2017-01-01 PROCEDURE — 85610 PROTHROMBIN TIME: CPT | Performed by: HOSPITALIST

## 2017-01-01 PROCEDURE — 88313 SPECIAL STAINS GROUP 2: CPT | Performed by: INTERNAL MEDICINE

## 2017-01-01 PROCEDURE — 94640 AIRWAY INHALATION TREATMENT: CPT

## 2017-01-01 PROCEDURE — 83010 ASSAY OF HAPTOGLOBIN QUANT: CPT | Performed by: HOSPITALIST

## 2017-01-01 PROCEDURE — 97110 THERAPEUTIC EXERCISES: CPT

## 2017-01-01 PROCEDURE — 88305 TISSUE EXAM BY PATHOLOGIST: CPT | Performed by: INTERNAL MEDICINE

## 2017-01-01 PROCEDURE — 94729 DIFFUSING CAPACITY: CPT | Performed by: INTERNAL MEDICINE

## 2017-01-01 PROCEDURE — 82248 BILIRUBIN DIRECT: CPT | Performed by: HOSPITALIST

## 2017-01-01 PROCEDURE — 85007 BL SMEAR W/DIFF WBC COUNT: CPT | Performed by: HOSPITALIST

## 2017-01-01 PROCEDURE — 84484 ASSAY OF TROPONIN QUANT: CPT | Performed by: PHYSICIAN ASSISTANT

## 2017-01-01 PROCEDURE — 99024 POSTOP FOLLOW-UP VISIT: CPT | Performed by: PHYSICIAN ASSISTANT

## 2017-01-01 PROCEDURE — 80053 COMPREHEN METABOLIC PANEL: CPT | Performed by: PHYSICIAN ASSISTANT

## 2017-01-01 PROCEDURE — 99213 OFFICE O/P EST LOW 20 MIN: CPT | Performed by: INTERNAL MEDICINE

## 2017-01-01 PROCEDURE — 86880 COOMBS TEST DIRECT: CPT | Performed by: HOSPITALIST

## 2017-01-01 PROCEDURE — 36415 COLL VENOUS BLD VENIPUNCTURE: CPT

## 2017-01-01 PROCEDURE — 80048 BASIC METABOLIC PNL TOTAL CA: CPT | Performed by: HOSPITALIST

## 2017-01-01 PROCEDURE — 85007 BL SMEAR W/DIFF WBC COUNT: CPT | Performed by: PHYSICIAN ASSISTANT

## 2017-01-01 PROCEDURE — 76930: CPT | Performed by: THORACIC SURGERY (CARDIOTHORACIC VASCULAR SURGERY)

## 2017-01-01 PROCEDURE — 85060 BLOOD SMEAR INTERPRETATION: CPT | Performed by: HOSPITALIST

## 2017-01-01 PROCEDURE — 88264 CHROMOSOME ANALYSIS 20-25: CPT

## 2017-01-01 PROCEDURE — 86900 BLOOD TYPING SEROLOGIC ABO: CPT | Performed by: PHYSICIAN ASSISTANT

## 2017-01-01 PROCEDURE — 94799 UNLISTED PULMONARY SVC/PX: CPT

## 2017-01-01 PROCEDURE — P9016 RBC LEUKOCYTES REDUCED: HCPCS

## 2017-01-01 PROCEDURE — 99233 SBSQ HOSP IP/OBS HIGH 50: CPT | Performed by: HOSPITALIST

## 2017-01-01 PROCEDURE — 99231 SBSQ HOSP IP/OBS SF/LOW 25: CPT | Performed by: THORACIC SURGERY (CARDIOTHORACIC VASCULAR SURGERY)

## 2017-01-01 PROCEDURE — 33010 PR PERICARDIOCENTESIS INITIAL: CPT | Performed by: THORACIC SURGERY (CARDIOTHORACIC VASCULAR SURGERY)

## 2017-01-01 PROCEDURE — 36430 TRANSFUSION BLD/BLD COMPNT: CPT

## 2017-01-01 PROCEDURE — 71250 CT THORAX DX C-: CPT

## 2017-01-01 PROCEDURE — 85014 HEMATOCRIT: CPT | Performed by: HOSPITALIST

## 2017-01-01 PROCEDURE — 97161 PT EVAL LOW COMPLEX 20 MIN: CPT

## 2017-01-01 PROCEDURE — 85097 BONE MARROW INTERPRETATION: CPT | Performed by: INTERNAL MEDICINE

## 2017-01-01 PROCEDURE — 84443 ASSAY THYROID STIM HORMONE: CPT | Performed by: HOSPITALIST

## 2017-01-01 PROCEDURE — 97165 OT EVAL LOW COMPLEX 30 MIN: CPT | Performed by: OCCUPATIONAL THERAPIST

## 2017-01-01 PROCEDURE — 86900 BLOOD TYPING SEROLOGIC ABO: CPT

## 2017-01-01 PROCEDURE — 85025 COMPLETE CBC W/AUTO DIFF WBC: CPT | Performed by: HOSPITALIST

## 2017-01-01 PROCEDURE — 99232 SBSQ HOSP IP/OBS MODERATE 35: CPT | Performed by: INTERNAL MEDICINE

## 2017-01-01 PROCEDURE — 93010 ELECTROCARDIOGRAM REPORT: CPT | Performed by: INTERNAL MEDICINE

## 2017-01-01 PROCEDURE — 94060 EVALUATION OF WHEEZING: CPT | Performed by: INTERNAL MEDICINE

## 2017-01-01 PROCEDURE — 99214 OFFICE O/P EST MOD 30 MIN: CPT | Performed by: INTERNAL MEDICINE

## 2017-01-01 PROCEDURE — 86850 RBC ANTIBODY SCREEN: CPT | Performed by: PHYSICIAN ASSISTANT

## 2017-01-01 PROCEDURE — 88237 TISSUE CULTURE BONE MARROW: CPT

## 2017-01-01 PROCEDURE — 99204 OFFICE O/P NEW MOD 45 MIN: CPT | Performed by: INTERNAL MEDICINE

## 2017-01-01 PROCEDURE — 0W9D30Z DRAINAGE OF PERICARDIAL CAVITY WITH DRAINAGE DEVICE, PERCUTANEOUS APPROACH: ICD-10-PCS | Performed by: THORACIC SURGERY (CARDIOTHORACIC VASCULAR SURGERY)

## 2017-01-01 PROCEDURE — 99232 SBSQ HOSP IP/OBS MODERATE 35: CPT | Performed by: HOSPITALIST

## 2017-01-01 PROCEDURE — 97530 THERAPEUTIC ACTIVITIES: CPT

## 2017-01-01 PROCEDURE — 82728 ASSAY OF FERRITIN: CPT | Performed by: HOSPITALIST

## 2017-01-01 PROCEDURE — 99213 OFFICE O/P EST LOW 20 MIN: CPT | Performed by: PHYSICIAN ASSISTANT

## 2017-01-01 PROCEDURE — 82533 TOTAL CORTISOL: CPT | Performed by: HOSPITALIST

## 2017-01-01 PROCEDURE — 93005 ELECTROCARDIOGRAM TRACING: CPT | Performed by: PHYSICIAN ASSISTANT

## 2017-01-01 PROCEDURE — 85379 FIBRIN DEGRADATION QUANT: CPT | Performed by: PHYSICIAN ASSISTANT

## 2017-01-01 PROCEDURE — 99214 OFFICE O/P EST MOD 30 MIN: CPT | Performed by: NURSE PRACTITIONER

## 2017-01-01 PROCEDURE — 71020 HC CHEST PA AND LATERAL: CPT

## 2017-01-01 PROCEDURE — 93306 TTE W/DOPPLER COMPLETE: CPT | Performed by: INTERNAL MEDICINE

## 2017-01-01 PROCEDURE — 83605 ASSAY OF LACTIC ACID: CPT | Performed by: PHYSICIAN ASSISTANT

## 2017-01-01 PROCEDURE — 86920 COMPATIBILITY TEST SPIN: CPT

## 2017-01-01 PROCEDURE — 93308 TTE F-UP OR LMTD: CPT

## 2017-01-01 PROCEDURE — 25010000002 VANCOMYCIN PER 500 MG: Performed by: THORACIC SURGERY (CARDIOTHORACIC VASCULAR SURGERY)

## 2017-01-01 PROCEDURE — 07DR3ZX EXTRACTION OF ILIAC BONE MARROW, PERCUTANEOUS APPROACH, DIAGNOSTIC: ICD-10-PCS | Performed by: PATHOLOGY

## 2017-01-01 PROCEDURE — 99223 1ST HOSP IP/OBS HIGH 75: CPT | Performed by: HOSPITALIST

## 2017-01-01 PROCEDURE — 86901 BLOOD TYPING SEROLOGIC RH(D): CPT | Performed by: PHYSICIAN ASSISTANT

## 2017-01-01 PROCEDURE — 85045 AUTOMATED RETICULOCYTE COUNT: CPT | Performed by: HOSPITALIST

## 2017-01-01 PROCEDURE — 85025 COMPLETE CBC W/AUTO DIFF WBC: CPT | Performed by: INTERNAL MEDICINE

## 2017-01-01 PROCEDURE — 96372 THER/PROPH/DIAG INJ SC/IM: CPT

## 2017-01-01 PROCEDURE — 85730 THROMBOPLASTIN TIME PARTIAL: CPT | Performed by: HOSPITALIST

## 2017-01-01 PROCEDURE — 86901 BLOOD TYPING SEROLOGIC RH(D): CPT

## 2017-01-01 PROCEDURE — 94726 PLETHYSMOGRAPHY LUNG VOLUMES: CPT | Performed by: INTERNAL MEDICINE

## 2017-01-01 PROCEDURE — 85007 BL SMEAR W/DIFF WBC COUNT: CPT | Performed by: INTERNAL MEDICINE

## 2017-01-01 PROCEDURE — 83550 IRON BINDING TEST: CPT | Performed by: HOSPITALIST

## 2017-01-01 PROCEDURE — 85018 HEMOGLOBIN: CPT | Performed by: HOSPITALIST

## 2017-01-01 PROCEDURE — 63510000001 EPOETIN ALFA PER 1000 UNITS: Performed by: INTERNAL MEDICINE

## 2017-01-01 PROCEDURE — 83540 ASSAY OF IRON: CPT | Performed by: HOSPITALIST

## 2017-01-01 PROCEDURE — 82607 VITAMIN B-12: CPT | Performed by: HOSPITALIST

## 2017-01-01 PROCEDURE — 88311 DECALCIFY TISSUE: CPT | Performed by: INTERNAL MEDICINE

## 2017-01-01 PROCEDURE — 99239 HOSP IP/OBS DSCHRG MGMT >30: CPT | Performed by: HOSPITALIST

## 2017-01-01 PROCEDURE — 88184 FLOWCYTOMETRY/ TC 1 MARKER: CPT

## 2017-01-01 PROCEDURE — 83615 LACTATE (LD) (LDH) ENZYME: CPT | Performed by: HOSPITALIST

## 2017-01-01 PROCEDURE — 88185 FLOWCYTOMETRY/TC ADD-ON: CPT

## 2017-01-01 RX ORDER — FAMOTIDINE 20 MG/1
20 TABLET, FILM COATED ORAL ONCE
Status: COMPLETED | OUTPATIENT
Start: 2017-01-01 | End: 2017-01-01

## 2017-01-01 RX ORDER — LIDOCAINE HYDROCHLORIDE 10 MG/ML
0.5 INJECTION, SOLUTION EPIDURAL; INFILTRATION; INTRACAUDAL; PERINEURAL ONCE
Status: COMPLETED | OUTPATIENT
Start: 2017-01-01 | End: 2017-01-01

## 2017-01-01 RX ORDER — OMEPRAZOLE 20 MG/1
CAPSULE, DELAYED RELEASE ORAL
COMMUNITY
Start: 2017-01-01

## 2017-01-01 RX ORDER — SODIUM CHLORIDE, SODIUM LACTATE, POTASSIUM CHLORIDE, CALCIUM CHLORIDE 600; 310; 30; 20 MG/100ML; MG/100ML; MG/100ML; MG/100ML
9 INJECTION, SOLUTION INTRAVENOUS CONTINUOUS
Status: CANCELLED | OUTPATIENT
Start: 2017-01-01

## 2017-01-01 RX ORDER — ACETAMINOPHEN 325 MG/1
650 TABLET ORAL ONCE
Status: CANCELLED | OUTPATIENT
Start: 2017-01-01 | End: 2017-01-01

## 2017-01-01 RX ORDER — SODIUM CHLORIDE 9 MG/ML
250 INJECTION, SOLUTION INTRAVENOUS AS NEEDED
Status: CANCELLED | OUTPATIENT
Start: 2017-01-01

## 2017-01-01 RX ORDER — VANCOMYCIN HYDROCHLORIDE 1 G/200ML
15 INJECTION, SOLUTION INTRAVENOUS
Status: DISCONTINUED | OUTPATIENT
Start: 2017-01-01 | End: 2017-01-01

## 2017-01-01 RX ORDER — IPRATROPIUM BROMIDE AND ALBUTEROL SULFATE 2.5; .5 MG/3ML; MG/3ML
3 SOLUTION RESPIRATORY (INHALATION)
Status: DISCONTINUED | OUTPATIENT
Start: 2017-01-01 | End: 2017-01-01

## 2017-01-01 RX ORDER — FAMOTIDINE 10 MG/ML
20 INJECTION, SOLUTION INTRAVENOUS ONCE
Status: CANCELLED | OUTPATIENT
Start: 2017-01-01 | End: 2017-01-01

## 2017-01-01 RX ORDER — ACETAMINOPHEN 325 MG/1
650 TABLET ORAL EVERY 4 HOURS PRN
Status: DISCONTINUED | OUTPATIENT
Start: 2017-01-01 | End: 2017-01-01 | Stop reason: HOSPADM

## 2017-01-01 RX ORDER — TRAMADOL HYDROCHLORIDE 50 MG/1
TABLET ORAL
COMMUNITY
Start: 2017-01-01

## 2017-01-01 RX ORDER — ASPIRIN 81 MG/1
81 TABLET ORAL DAILY
Status: DISCONTINUED | OUTPATIENT
Start: 2017-01-01 | End: 2017-01-01

## 2017-01-01 RX ORDER — ISOSORBIDE MONONITRATE 30 MG/1
30 TABLET, EXTENDED RELEASE ORAL
Status: DISCONTINUED | OUTPATIENT
Start: 2017-01-01 | End: 2017-01-01 | Stop reason: HOSPADM

## 2017-01-01 RX ORDER — IPRATROPIUM BROMIDE AND ALBUTEROL SULFATE 2.5; .5 MG/3ML; MG/3ML
3 SOLUTION RESPIRATORY (INHALATION) EVERY 4 HOURS PRN
Status: DISCONTINUED | OUTPATIENT
Start: 2017-01-01 | End: 2017-01-01 | Stop reason: HOSPADM

## 2017-01-01 RX ORDER — FAMOTIDINE 20 MG/1
20 TABLET, FILM COATED ORAL ONCE
Status: CANCELLED | OUTPATIENT
Start: 2017-01-01 | End: 2017-01-01

## 2017-01-01 RX ORDER — SODIUM CHLORIDE 0.9 % (FLUSH) 0.9 %
1-10 SYRINGE (ML) INJECTION AS NEEDED
Status: DISCONTINUED | OUTPATIENT
Start: 2017-01-01 | End: 2017-01-01 | Stop reason: HOSPADM

## 2017-01-01 RX ORDER — ONDANSETRON 2 MG/ML
4 INJECTION INTRAMUSCULAR; INTRAVENOUS EVERY 6 HOURS PRN
Status: DISCONTINUED | OUTPATIENT
Start: 2017-01-01 | End: 2017-01-01 | Stop reason: HOSPADM

## 2017-01-01 RX ORDER — SODIUM CHLORIDE 0.9 % (FLUSH) 0.9 %
1-10 SYRINGE (ML) INJECTION AS NEEDED
Status: CANCELLED | OUTPATIENT
Start: 2017-01-01

## 2017-01-01 RX ORDER — LIDOCAINE HYDROCHLORIDE 10 MG/ML
1 INJECTION, SOLUTION EPIDURAL; INFILTRATION; INTRACAUDAL; PERINEURAL ONCE
Status: CANCELLED | OUTPATIENT
Start: 2017-01-01 | End: 2017-01-01

## 2017-01-01 RX ORDER — SODIUM CHLORIDE, SODIUM LACTATE, POTASSIUM CHLORIDE, CALCIUM CHLORIDE 600; 310; 30; 20 MG/100ML; MG/100ML; MG/100ML; MG/100ML
9 INJECTION, SOLUTION INTRAVENOUS CONTINUOUS
Status: DISCONTINUED | OUTPATIENT
Start: 2017-01-01 | End: 2017-01-01 | Stop reason: HOSPADM

## 2017-01-01 RX ORDER — CYCLOBENZAPRINE HCL 10 MG
TABLET ORAL
COMMUNITY
Start: 2017-01-01

## 2017-01-01 RX ORDER — DIPHENHYDRAMINE HCL 25 MG
25 CAPSULE ORAL ONCE
Status: CANCELLED | OUTPATIENT
Start: 2017-01-01 | End: 2017-01-01

## 2017-01-01 RX ORDER — ALBUTEROL SULFATE 90 UG/1
2 AEROSOL, METERED RESPIRATORY (INHALATION)
Status: DISCONTINUED | OUTPATIENT
Start: 2017-01-01 | End: 2017-01-01

## 2017-01-01 RX ORDER — NICOTINE 21 MG/24HR
1 PATCH, TRANSDERMAL 24 HOURS TRANSDERMAL EVERY 24 HOURS
Status: DISCONTINUED | OUTPATIENT
Start: 2017-01-01 | End: 2017-01-01 | Stop reason: HOSPADM

## 2017-01-01 RX ORDER — VANCOMYCIN HYDROCHLORIDE 1 G/200ML
15 INJECTION, SOLUTION INTRAVENOUS ONCE
Status: COMPLETED | OUTPATIENT
Start: 2017-01-01 | End: 2017-01-01

## 2017-01-01 RX ORDER — ONDANSETRON 4 MG/1
4 TABLET, FILM COATED ORAL EVERY 6 HOURS PRN
Status: DISCONTINUED | OUTPATIENT
Start: 2017-01-01 | End: 2017-01-01 | Stop reason: HOSPADM

## 2017-01-01 RX ADMIN — IPRATROPIUM BROMIDE AND ALBUTEROL SULFATE 3 ML: .5; 3 SOLUTION RESPIRATORY (INHALATION) at 07:02

## 2017-01-01 RX ADMIN — IPRATROPIUM BROMIDE AND ALBUTEROL SULFATE 3 ML: .5; 3 SOLUTION RESPIRATORY (INHALATION) at 12:43

## 2017-01-01 RX ADMIN — IPRATROPIUM BROMIDE AND ALBUTEROL SULFATE 3 ML: .5; 3 SOLUTION RESPIRATORY (INHALATION) at 20:39

## 2017-01-01 RX ADMIN — VANCOMYCIN HYDROCHLORIDE 1000 MG: 1 INJECTION, SOLUTION INTRAVENOUS at 19:01

## 2017-01-01 RX ADMIN — IPRATROPIUM BROMIDE AND ALBUTEROL SULFATE 3 ML: .5; 3 SOLUTION RESPIRATORY (INHALATION) at 15:40

## 2017-01-01 RX ADMIN — NICOTINE 1 PATCH: 14 PATCH, EXTENDED RELEASE TRANSDERMAL at 22:19

## 2017-01-01 RX ADMIN — IPRATROPIUM BROMIDE AND ALBUTEROL SULFATE 3 ML: .5; 3 SOLUTION RESPIRATORY (INHALATION) at 15:53

## 2017-01-01 RX ADMIN — ISOSORBIDE MONONITRATE 30 MG: 30 TABLET, EXTENDED RELEASE ORAL at 08:05

## 2017-01-01 RX ADMIN — LIDOCAINE HYDROCHLORIDE 0.2 ML: 10 INJECTION, SOLUTION EPIDURAL; INFILTRATION; INTRACAUDAL; PERINEURAL at 18:04

## 2017-01-01 RX ADMIN — ACETAMINOPHEN 650 MG: 325 TABLET ORAL at 21:19

## 2017-01-01 RX ADMIN — ACETAMINOPHEN 650 MG: 325 TABLET ORAL at 06:33

## 2017-01-01 RX ADMIN — ISOSORBIDE MONONITRATE 30 MG: 30 TABLET, EXTENDED RELEASE ORAL at 08:37

## 2017-01-01 RX ADMIN — ASPIRIN 81 MG: 81 TABLET, COATED ORAL at 08:03

## 2017-01-01 RX ADMIN — SODIUM CHLORIDE, POTASSIUM CHLORIDE, SODIUM LACTATE AND CALCIUM CHLORIDE 9 ML/HR: 600; 310; 30; 20 INJECTION, SOLUTION INTRAVENOUS at 18:04

## 2017-01-01 RX ADMIN — NICOTINE 1 PATCH: 14 PATCH, EXTENDED RELEASE TRANSDERMAL at 20:35

## 2017-01-01 RX ADMIN — ERYTHROPOIETIN 40000 UNITS: 40000 INJECTION, SOLUTION INTRAVENOUS; SUBCUTANEOUS at 14:02

## 2017-01-01 RX ADMIN — NICOTINE 1 PATCH: 14 PATCH, EXTENDED RELEASE TRANSDERMAL at 21:20

## 2017-01-01 RX ADMIN — IPRATROPIUM BROMIDE AND ALBUTEROL SULFATE 3 ML: .5; 3 SOLUTION RESPIRATORY (INHALATION) at 19:42

## 2017-01-01 RX ADMIN — IPRATROPIUM BROMIDE AND ALBUTEROL SULFATE 3 ML: .5; 3 SOLUTION RESPIRATORY (INHALATION) at 15:25

## 2017-01-01 RX ADMIN — IPRATROPIUM BROMIDE AND ALBUTEROL SULFATE 3 ML: .5; 3 SOLUTION RESPIRATORY (INHALATION) at 07:25

## 2017-01-01 RX ADMIN — ACETAMINOPHEN 650 MG: 325 TABLET ORAL at 13:27

## 2017-01-01 RX ADMIN — IPRATROPIUM BROMIDE AND ALBUTEROL SULFATE 3 ML: .5; 3 SOLUTION RESPIRATORY (INHALATION) at 11:43

## 2017-01-01 RX ADMIN — IPRATROPIUM BROMIDE AND ALBUTEROL SULFATE 3 ML: .5; 3 SOLUTION RESPIRATORY (INHALATION) at 11:47

## 2017-01-01 RX ADMIN — Medication 10 ML: at 18:00

## 2017-01-01 RX ADMIN — ASPIRIN 81 MG: 81 TABLET, COATED ORAL at 20:34

## 2017-01-01 RX ADMIN — ACETAMINOPHEN 650 MG: 325 TABLET ORAL at 17:18

## 2017-01-01 RX ADMIN — ACETAMINOPHEN 650 MG: 325 TABLET ORAL at 21:05

## 2017-01-01 RX ADMIN — IPRATROPIUM BROMIDE AND ALBUTEROL SULFATE 3 ML: .5; 3 SOLUTION RESPIRATORY (INHALATION) at 06:51

## 2017-01-01 RX ADMIN — NICOTINE 1 PATCH: 14 PATCH, EXTENDED RELEASE TRANSDERMAL at 20:38

## 2017-01-01 RX ADMIN — ACETAMINOPHEN 650 MG: 325 TABLET ORAL at 20:34

## 2017-01-01 RX ADMIN — ISOSORBIDE MONONITRATE 30 MG: 30 TABLET, EXTENDED RELEASE ORAL at 08:19

## 2017-01-01 RX ADMIN — ISOSORBIDE MONONITRATE 30 MG: 30 TABLET, EXTENDED RELEASE ORAL at 08:03

## 2017-01-01 RX ADMIN — IPRATROPIUM BROMIDE AND ALBUTEROL SULFATE 3 ML: .5; 3 SOLUTION RESPIRATORY (INHALATION) at 21:09

## 2017-01-01 RX ADMIN — FAMOTIDINE 20 MG: 20 TABLET ORAL at 18:00

## 2017-01-01 RX ADMIN — IPRATROPIUM BROMIDE AND ALBUTEROL SULFATE 3 ML: .5; 3 SOLUTION RESPIRATORY (INHALATION) at 07:30

## 2017-01-01 RX ADMIN — IPRATROPIUM BROMIDE AND ALBUTEROL SULFATE 3 ML: .5; 3 SOLUTION RESPIRATORY (INHALATION) at 17:52

## 2017-01-01 RX ADMIN — IPRATROPIUM BROMIDE AND ALBUTEROL SULFATE 3 ML: .5; 3 SOLUTION RESPIRATORY (INHALATION) at 11:50

## 2017-01-01 RX ADMIN — ACETAMINOPHEN 650 MG: 325 TABLET ORAL at 03:25

## 2017-01-01 RX ADMIN — ISOSORBIDE MONONITRATE 30 MG: 30 TABLET, EXTENDED RELEASE ORAL at 08:32

## 2017-01-01 RX ADMIN — IPRATROPIUM BROMIDE AND ALBUTEROL SULFATE 3 ML: .5; 3 SOLUTION RESPIRATORY (INHALATION) at 19:30

## 2017-01-01 RX ADMIN — ACETAMINOPHEN 650 MG: 325 TABLET ORAL at 00:48

## 2017-04-19 PROBLEM — R63.4 UNINTENDED WEIGHT LOSS: Status: ACTIVE | Noted: 2017-01-01

## 2017-04-19 PROBLEM — R09.02 HYPOXIA: Status: ACTIVE | Noted: 2017-01-01

## 2017-04-19 PROBLEM — Z72.0 TOBACCO ABUSE: Status: ACTIVE | Noted: 2017-01-01

## 2017-04-19 PROBLEM — R62.7 FTT (FAILURE TO THRIVE) IN ADULT: Status: ACTIVE | Noted: 2017-01-01

## 2017-04-19 PROBLEM — R53.82 CHRONIC FATIGUE: Status: ACTIVE | Noted: 2017-01-01

## 2017-04-19 PROBLEM — D64.9 ANEMIA: Status: ACTIVE | Noted: 2017-01-01

## 2017-04-19 NOTE — PLAN OF CARE
Problem: Activity Intolerance (Adult)  Goal: Identify Related Risk Factors and Signs and Symptoms  Outcome: Ongoing (interventions implemented as appropriate)    04/19/17 5763   Activity Intolerance   Activity Intolerance: Related Risk Factors functional decline;generalized weakness   Signs and Symptoms (Activity Intolerance) dyspnea/shortness of breath;dizziness/faintness       Goal: Activity Tolerance  Outcome: Ongoing (interventions implemented as appropriate)  Goal: Effective Energy Conservation Techniques  Outcome: Ongoing (interventions implemented as appropriate)    Problem: Skin Integrity Impairment, Risk/Actual (Adult)  Goal: Identify Related Risk Factors and Signs and Symptoms  Outcome: Ongoing (interventions implemented as appropriate)  Goal: Skin Integrity/Wound Healing  Outcome: Ongoing (interventions implemented as appropriate)

## 2017-04-19 NOTE — PROGRESS NOTES
"PROBLEM LIST:  1. CMML/MDS diagnosed in 11/2014.   2. Patient initiated on Vidaza at [facility] in [place], [place] in 12/2014.   3. Thrombocytopenia in the 70,000 range. At the time of diagnosis his  platelet count was around the same range.  4. Completed 6 cycles of Vidaza on 06/05/2015; has not been receiving Vidaza  for the last 6 months with stable platelet count.   5. Today his platelet count is 73,000. With some aggregation.      REASON FOR VISIT: Myelodysplasia    HISTORY OF PRESENT ILLNESS:   75-year-old gentleman with a low-grade myelodysplasia with mostly thrombocytopenia presents today for follow-up.  He presents today after spending the last 6 months in [place].  He is noticeably short of breath with a tumor sats in the high 70s.  He has lost almost 310 pounds since I last saw him.  He is profoundly fatigued.  I suspect he is fairly anemic.  Clinically otherwise denies any fevers or pain.    Past medical history, social history and family history was reviewed and unchanged from prior visit.    Review of Systems:    Review of Systems   Constitutional: Negative.    HENT: Negative.    Eyes: Negative.    Respiratory: Negative.    Cardiovascular: Negative.    Endocrine: Negative.    Genitourinary: Negative.    Musculoskeletal: Negative.    Skin: Negative.    Allergic/Immunologic: Negative.    Neurological: Negative.    Hematological: Negative.    Psychiatric/Behavioral: Negative.       A comprehensive 14 point review of systems was performed and was negative except as mentioned.      Medications:  The current medication list was reviewed in the EMR    ALLERGIES:    Allergies   Allergen Reactions   • Penicillins          Physical Exam    VITAL SIGNS:  BP 91/42  Pulse 104  Temp 98.1 °F (36.7 °C)  Resp 22  Ht 71.75\" (182.2 cm)  Wt 130 lb (59 kg)  SpO2 (!) 77%  BMI 17.75 kg/m2     Performance Status: 0    General: ill appearing,  cachetic  HEENT: sclera anicteric, oropharynx " clear, neck is supple  Lymphatics: no cervical, supraclavicular, or axillary adenopathy  Cardiovascular: regular rate and rhythm, no murmurs, rubs or gallops  Lungs: clear to auscultation bilaterally  Abdomen: soft, nontender, nondistended.  No palpable organomegaly  Extremities: no lower extremity edema  Skin: no rashes, lesions,or petechiae, +bruising  Msk:  Shows no weakness of the large muscle groups  Psych: Mood is stable    Wt Readings from Last 3 Encounters:   04/19/17 130 lb (59 kg)   10/12/16 136 lb (61.7 kg)   07/06/16 138 lb (62.6 kg)           Assessment/Plan    75-year-old gentleman with myelodysplasia.  He has noticeable worsening of his condition.  He is fairly hypoxic at room air and  My concern is he may have significant anemia playing a role here.  Regardless he needs a workup which is going to be difficult as an outpatient.  Going to see if I can admit him for get some of the workup done. I have arranged for admission at [facility].    Deniz Alves MD  [facility] Hematology and Oncology    4/19/2017

## 2017-04-19 NOTE — H&P
[facility] Medicine Services  HISTORY AND PHYSICAL    Primary Care Physician: Angel Underwood MD    Subjective     Chief Complaint:  fatigue    History of Present Illness:   Patient is a 76-year-old male with past medical history of MDS/CMML completed 6 cycles of VIdaza June/2016 followed by Dr. Austin, left-sided facial melanoma history of 30 radiation treatments in 2016, ongoing tobacco abuse, CAD history of CABG and memory impairment who presented to hematology oncology clinic today for follow-up, was noted to be satting in the high 70s on pulse ox, also reporting fatigue and dyspnea with minimal activity profound weight loss was admitted to the hospitalist service for further evaluation and management.  Per patient fatigue has been present for 3 months, only tolerates taking a few steps at a time without requiring rest secondary to muscle aches and shortness of air, has become progressive over the past several days.  Patient also notes chronic cough for several weeks occasionally productive with white sputum.  Occasional  dizziness upon standing. Patient reports approximately 45 pound unintentional weight loss in the past 1.5 years,l without change in po intake.  Patient denies recent fever, chills, night sweats, orthopnea, lower extremity swelling, N/V/D, hematuria, melena, lymph adenopathy or confusion.  Patient does note bruises easily, bleeds easily with minor injury however no recent trauma or bleeding noted.  Patient recently traveled home via car last week after 6 months stay in [place].  Had not been seen by a provider for fatigue or cough.  Patient denies history of COPD and heart failure.       Blood work currently pending including CMP, CBC, d-dimer given report of hypoxia in clinic and recent travel/PMH. Chest x-ray ordered.  Upon presentation to hospital patient satting high 90%'s on room air, blood pressure 109/64 heart rate 93.   Will check orthostatics.     Review of  Systems   Constitutional: Positive for fatigue and unexpected weight change. Negative for appetite change, chills and fever.   Respiratory: Positive for cough and shortness of breath (with exertion).    Cardiovascular: Negative for chest pain, palpitations and leg swelling.   Gastrointestinal: Negative for abdominal pain, blood in stool, diarrhea, nausea and vomiting.   Genitourinary: Negative for dysuria and hematuria.   Skin: Negative for rash.   Neurological: Positive for dizziness (Upon standing). Negative for headaches.   Hematological: Negative for adenopathy. Bruises/bleeds easily.   All other systems reviewed and are negative.     Otherwise complete ROS performed and negative except as mentioned in the HPI.    Past Medical History:   Diagnosis Date   • Bleeding    • CMML (chronic myelomonocytic leukemia)    • Easy bruising    • H/O colonoscopy 07/2014   • Kidney stones    • Melanoma on left side of face     s/p XRT 2016   • Myelodysplastic syndrome 12/2014    CMML, completed Chemo 6/2016   • Wears dentures    • Wears glasses        Past Surgical History:   Procedure Laterality Date   • BACK SURGERY     • CORONARY ARTERY BYPASS GRAFT  2002       Family History   Problem Relation Age of Onset   • Lung cancer Father    • No Known Problems Mother    • No Known Problems Sister    • No Known Problems Brother    • No Known Problems Daughter        Social History     Social History   • Marital status:      Spouse name: N/A   • Number of children: N/A   • Years of education: N/A     Occupational History   • Not on file.     Social History Main Topics   • Smoking status: Current Every Day Smoker     Packs/day: 1.00     Years: 60.00     Types: Cigarettes   • Smokeless tobacco: Not on file   • Alcohol use No   • Drug use: No   • Sexual activity: Not on file     Other Topics Concern   • Not on file     Social History Narrative    Lives with wife in [place], [place]       Medications:  Prescriptions Prior to  "Admission   Medication Sig Dispense Refill Last Dose   • aspirin 81 MG EC tablet Take 81 mg by mouth daily.   Past Week at Unknown time   • isosorbide mononitrate (IMDUR) 30 MG 24 hr tablet    4/19/2017 at Unknown time   • Multiple Vitamins-Minerals (MULTIVITAMIN ADULT PO) Take 1 tablet by mouth daily.   4/19/2017 at Unknown time   • cyclobenzaprine (FLEXERIL) 10 MG tablet       • PROAIR  (90 BASE) MCG/ACT inhaler           Allergies:  Allergies   Allergen Reactions   • Penicillins          Objective     Physical Exam:  Vital Signs: /64 (BP Location: Right arm, Patient Position: Lying)  Pulse 91  Temp 98.3 °F (36.8 °C) (Oral)   Resp 18  Ht 72\" (182.9 cm)  Wt 129 lb 8 oz (58.7 kg)  SpO2 99%  BMI 17.56 kg/m2  Physical Exam  Temp:  [98.1 °F (36.7 °C)-98.3 °F (36.8 °C)] 98.3 °F (36.8 °C)  Heart Rate:  [] 91  Resp:  [18-22] 18  BP: ()/(42-69) 109/64  Constitutional: Cachectic elderly male , appears in NAD, awake, alert  Eyes: PERRLA, sclerae anicteric, no conjunctival injection  Neck: supple, trachea midline  Respiratory: Rhonchi throughout, cleared with strong cough; breath sounds diminished significantly throughout all lung fields, no wheezes or rales. nonlabored respirations  Cardiovascular: RRR, no murmurs, rubs, or gallops, palpable pedal pulses bilaterally  Gastrointestinal: Positive bowel sounds, soft, nontender, nondistended  Musculoskeletal: No bilateral ankle edema, no clubbing or cyanosis to bilateral lower extremities  Psychiatric: oriented x 3, appropriate affect, cooperative  Neurologic: Strength symmetric in all extremities, speech is clear, follows commands   Skin: ecchymosis bilat UE    Results Reviewed:    Results from last 7 days  Lab Units 04/19/17  1756   WBC 10*3/mm3 27.31*   HEMOGLOBIN g/dL 6.4*   PLATELETS 10*3/mm3 209           I have personally reviewed and interpreted available lab data, radiology studies and ECG obtained at time of admission.     Assessment / " Plan     Assessment/Problem List:   Hospital Problem List     * (Principal)Hypoxia     Myelodysplasia, low grade    CMML (chronic myelomonocytic leukemia)    Overview Signed 10/4/2016  1:08 PM by Apple Velez                Thrombocytopenia    Overview Signed 10/4/2016  1:08 PM by Apple Velez              Chronic fatigue    Unintended weight loss    Tobacco abuse    FTT (failure to thrive) in adult            Plan:  Hypoxia reported while in clinic today, reported to be high 70s  -stable, currently satting in high 90's on RA  -Chest x-ray PA/lateral, reviewed imaging + chronic fibrotic changes/elongated lungs, await official read  -CMP/CBC pending  -BNP pending  -Lactate 1.4  -D dimer 1.45, check renal function then CT chest  -duo nebs     fatigue/dyspnea with activity  -EKG  -CBC with differential, troponin  -PT and case management consult in the a.m.  -no IVF for now, await results of blood work first    CMML/MDS  -Consult heme/onc given the a.m. followed by Dr. Austin    Unintentional weight loss/failure to thrive  -Nutrition consult    Tobacco abuse  -nicotine patch    DVT prophylaxis: Mechanical only  Code Status:  full      Casie M Mayne, PA-C 04/19/17 6:45 PM      Brief Attending Note   I have seen and examined the patient, performing an independent face-to-face diagnostic evaluation with plan of care reviewed and developed with the advanced practice clinician (APC).    Brief Summary Statement/HPI:   Patient is a 76-year-old male with a past history of MDS, CMML, CKD III (past Cr 1.8-1.9) chronic from a cytopenia, melanoma, CAD with history of CABG, and nephrolithiasis who is admitted as a direct request of Dr. Alves for evaluation of acute hypoxia with room air oxygen saturation of 77% while attending an outpatient appointment (Room air sats normal on arrival here).  Patient spends 5-6 months of his year in [facility] [place] [facility] and recently returned to [place].  Over the past several months patient  has noted progressive, profound fatigue with significantly limited exertional tolerance and inability to do anything but sit still.  When asked specifically about dyspnea or exertional chest pain, patient denied, reporting mainly that he does not have the energy or strength to do anything but sit still.  He's had anxiety since this time as well as a cough productive of white sputum for the past 2 weeks.  No fevers, chills, night sweats, headache, blurry vision, chest heaviness, palpitations, abdominal pain, nausea, vomiting, hematochezia, or melena.  Patient had negative colonoscopy one year ago.  No recent falls or injuries.  Over the past 18 months he has lost ~45 pounds.  He has an active smoker and, according to family, will smoke until the day he dies.       Attending Physical Exam:  Temp:  [98.1 °F (36.7 °C)-98.3 °F (36.8 °C)] 98.3 °F (36.8 °C)  Heart Rate:  [] 86  Resp:  [16-22] 16  BP: ()/(42-69) 109/64  Constitutional: chronically ill appearing, no acute distress, awake, alert, pale   Eyes: PERRLA, sclerae anicteric, no conjunctival injection, EOMI  Neck: supple, no thyromegaly, trachea midline, no stridor  Respiratory: Diminished bilaterally with rare crackles at bases, no wheezing, nonlabored respirations   Cardiovascular: RRR, no murmurs, rubs, or gallops, palpable pedal pulses bilaterally  Gastrointestinal: Positive bowel sounds, soft, nontender, nondistended, no hepatomegaly/splenomegaly  Musculoskeletal: No bilateral ankle edema, no clubbing or cyanosis to bilateral lower extremities  Psychiatric: oriented x 3, appropriate affect, cooperative  Neurologic: Strength symmetric in all extremities, Cranial Nerves grossly intact to confrontation     CXR reviewed with hyperinflated lung fields, no obvious focal abnormalities on my personal review and interp  Labs notable for Cr 1.6, AGAP metabolic acidosis, Hgb 6.4,      Brief Assessment/Plan :  Pt presents with symptomatic acute anemia without  evidence of active GI blood loss associated with a several month decline in his functional status and significant weight loss. Dr. Austin will consult and aid in further evaluation. Plan to transfuse 2 units PRBCs, workup underlying cause of anemia, obtain a CT chest given hypoxia at outside (holding on CTA Chest given CKD and normal RA sat on arrival), and pursue additional metabolic work-up for pt's recent decline in health status. I will order a peripheral smear given his elevated lymphocyte, monocyte, and granulocyte counts. Check echo. Consult PT/OT/CM.  Depending on results of initial work-up, will consider evaluation of his suspected COPD as pulmonary cachexia may explain his symptoms. May need VQ scan if hypoxia recurs.    See above for further detailed assessment and plan developed with APC which I have reviewed and/or edited.    I believe this patient meets INPATIENT status due to the need for care of acute symptomatic anemia which can only be reasonably provided in an hospital setting such as aggressive/expedited ancillary services and/or consultation services and the necessity for IV medications (blood transfusions), close physician monitoring and/or the possible need for procedures.  In such, I feel patient’s risk for adverse outcomes and need for care warrant INPATIENT evaluation and predict the patient’s care encounter to likely last beyond 2 midnights.      Aman Eckert MD  04/19/17  9:27 PM

## 2017-04-20 PROBLEM — I31.4 PERICARDIAL EFFUSION WITH CARDIAC TAMPONADE: Status: ACTIVE | Noted: 2017-01-01

## 2017-04-20 PROBLEM — I31.39 PERICARDIAL EFFUSION WITH CARDIAC TAMPONADE: Status: ACTIVE | Noted: 2017-01-01

## 2017-04-20 NOTE — PROGRESS NOTES
Acute Care - Occupational Therapy Initial Evaluation  [facility]     Patient Name: Jerod Zelaya  : 1940  MRN: [id number]  Today's Date: 2017  Onset of Illness/Injury or Date of Surgery Date: 17  Date of Referral to OT: 17  Referring Physician: Mayne, PA    Admit Date: 2017       ICD-10-CM ICD-9-CM   1. Impaired functional mobility, balance, gait, and endurance Z74.09 V49.89   2. Impaired mobility and ADLs Z74.09 799.89     Patient Active Problem List   Diagnosis   • Myelodysplasia, low grade   • CMML (chronic myelomonocytic leukemia)   • Thrombocytopenia   • Hypoxia    • Chronic fatigue   • Unintended weight loss   • Tobacco abuse   • FTT (failure to thrive) in adult   • Anemia   • Pericardial effusion with cardiac tamponade     Past Medical History:   Diagnosis Date   • Bleeding    • CMML (chronic myelomonocytic leukemia)    • Easy bruising    • H/O colonoscopy 2014   • Kidney stones    • Melanoma on left side of face     s/p XRT    • Myelodysplastic syndrome 2014    CMML, completed Chemo 2016   • Wears dentures    • Wears glasses      Past Surgical History:   Procedure Laterality Date   • BACK SURGERY     • CORONARY ARTERY BYPASS GRAFT            OT ASSESSMENT FLOWSHEET (last 72 hours)      OT Evaluation       17 1114 17 1113 17 1700          Rehab Evaluation    Document Type evaluation  -MJ evaluation  -AR       Subjective Information agree to therapy;no complaints  -MJ no complaints;agree to therapy  -AR       Patient Effort, Rehab Treatment excellent  -MJ excellent  -AR       Symptoms Noted During/After Treatment fatigue;shortness of breath  -MJ shortness of breath   mild dyspnea at end of ambulation  -AR       General Information    Patient Profile Review yes  -MJ yes  -AR       Onset of Illness/Injury or Date of Surgery Date 17  -MJ 17  -AR       Referring Physician Mayne, PA  -MJ Dr. Eckert  -AR       General Observations Pt  supine in bed, IV heplocked, tele, on RA. Family present  - pt supine, family members at bedside  -AR       Pertinent History Of Current Problem Pt presents from MD office with low oxygen sats (70's), fatigue, dyspnea and weight loss over last few months.   - Pt is a 76 yom who presented to [facility] as direct admit from hematology/oncology office visit. Pt admitted for hypoxemia with RA sat of 77 in office, report of progressive fatigue x 3 months which is profound with minimal exertion, unintentional 45# wt loss x 18 months and severe anemia. Pt received 2 units PRBC overnight.    -AR       Precautions/Limitations fall precautions;other (see comments)   monitor vitals  - fall precautions  -AR       Prior Level of Function independent:;all household mobility;community mobility;gait;transfer;bed mobility   Pt fatigued easily, gait distance limited  - independent:;all household mobility;community mobility;gait;transfer;min assist:;ADL's   bathing 2x/week due to weakness, dyspnea with all activities  -AR       Equipment Currently Used at Home grab bar  - none  -AR none  -AP      Plans/Goals Discussed With patient and family;agreed upon  - patient and family;agreed upon  -AR       Risks Reviewed patient and family:;LOB;increased discomfort  - patient and family:;nausea/vomiting;LOB;dizziness;increased discomfort;change in vital signs;lines disloged  -AR       Benefits Reviewed patient and family:;improve function;increase independence;increase strength;increase balance;decrease pain  - patient and family:;improve function;increase independence;increase strength;increase balance;decrease pain;decrease risk of DVT;increase knowledge  -AR       Barriers to Rehab medically complex  - none identified  -AR       Living Environment    Lives With spouse  -MJ spouse  -AR spouse  -AP      Living Arrangements house  - house  -AR house  -AP      Home Accessibility tub/shower is not walk in  - bed and bath on same  "level;tub/shower is not walk in   pt does not access second floor of home  -AR stairs to enter home;stairs within home  -AP      Stair Railings at Home   none  -AP      Type of Financial/Environmental Concern   none  -AP      Transportation Available  car;family or friend will provide  -AR car  -AP      Living Environment Comment Pt lives first floor, does not have to perform steps  -MJ Pt spends winter in [place] and summer/fall in [place]. They just retured back to [place] last week. he lives with spouse who has been assisting him. He reports \"furniture walking\" and very limited mobility of a few feet to bathroom in his home.   -AR       Clinical Impression    Date of Referral to OT  04/19/17  -AR       OT Diagnosis  decreased independence with ADLs  -AR       Patient/Family Goals Statement  inprove strength   -AR       Criteria for Skilled Therapeutic Interventions Met  yes;treatment indicated  -AR       Rehab Potential  good, to achieve stated therapy goals  -AR       Therapy Frequency  daily   per priority policy  -AR       Anticipated Equipment Needs At Discharge  raised toilet seat;tub bench  -AR       Anticipated Discharge Disposition  home with assist  -AR       Functional Level Prior    Ambulation   0-->independent  -AP      Transferring   0-->independent  -AP      Toileting   0-->independent  -AP      Bathing   0-->independent  -AP      Dressing   0-->independent  -AP      Eating   0-->independent  -AP      Communication   0-->understands/communicates without difficulty  -AP      Swallowing   0-->swallows foods/liquids without difficulty  -AP      Vital Signs    Pre Systolic BP Rehab 100  -  -AR       Pre Treatment Diastolic BP 65  -MJ 65  -AR       Intra Systolic BP Rehab 100  -  -AR       Intra Treatment Diastolic BP 79  -MJ 79  -AR       Post Systolic BP Rehab  --  -AR       Post Treatment Diastolic BP  --  -AR       Pretreatment Heart Rate (beats/min) 75  -MJ 79  -AR       Posttreatment Heart Rate " (beats/min) 94  -MJ 90  -AR       Pre SpO2 (%) 98  -MJ 97  -AR       O2 Delivery Pre Treatment room air  -MJ room air  -AR       Post SpO2 (%) 99  -  -AR       O2 Delivery Post Treatment room air  -MJ room air  -AR       Pre Patient Position Supine  -MJ Supine  -AR       Intra Patient Position Sitting   Sitting EOB, denies dizziness  -MJ Sitting  -AR       Post Patient Position Supine  -MJ Supine  -AR       Pain Assessment    Pain Assessment No/denies pain  -MJ No/denies pain  -AR       Vision Assessment/Intervention    Visual Impairment  WFL with corrective lenses  -AR       Cognitive Assessment/Intervention    Current Cognitive/Communication Assessment functional  -MJ functional  -AR       Orientation Status oriented x 4  -MJ oriented x 4  -AR       Follows Commands/Answers Questions 100% of the time;able to follow multi-step instructions;needs cueing  -% of the time;able to follow multi-step instructions  -AR       Personal Safety WNL/WFL  -MJ WNL/WFL  -AR       Personal Safety Interventions  gait belt;nonskid shoes/slippers when out of bed;supervised activity  -AR       ROM (Range of Motion)    General ROM no range of motion deficits identified  -MJ no range of motion deficits identified   BUE  -AR       General ROM Detail for B-L LE; defer UE to OT  -MJ        MMT (Manual Muscle Testing)    General MMT Assessment no strength deficits identified  -MJ no strength deficits identified   BUE MMT 4+/5  -AR       General MMT Assessment Detail for B-L LE; defer UE to OT  -MJ        Bed Mobility, Assessment/Treatment    Bed Mobility, Assistive Device  bed rails;head of bed elevated  -AR       Bed Mobility, Scoot/Bridge, [place]  independent  -AR       Bed Mob, Supine to Sit, [place] independent  -MJ conditional independence  -AR       Bed Mob, Sit to Supine, [place] independent  -MJ conditional independence  -AR       Transfer Assessment/Treatment    Transfers, Sit-Stand [place] stand by  assist  -MJ contact guard assist  -AR       Transfers, Stand-Sit [place] stand by assist  -MJ contact guard assist  -AR       Transfer, Impairments  impaired balance  -AR       Transfer, Comment  iitial mild retrograde posture, pt able to correct without assist  -AR       Functional Mobility    Functional Mobility- Ind. Level  contact guard assist  -AR       Upper Body Dressing Assessment/Training    UB Dressing Assess/Train, Clothing Type  donning:;doffing:;hospital gown  -AR       UB Dressing Assess/Train, Position  edge of bed  -AR       UB Dressing Assess/Train, [place]  minimum assist (75% patient effort);verbal cues required  -AR       Motor Skills/Interventions    Additional Documentation  Balance Skills Training (Group)  -AR       Balance Skills Training    Sitting-Level of Assistance  Independent  -AR       Sitting # of Minutes  8  -AR       Standing-Level of Assistance  Contact guard  -AR       Sensory Assessment/Intervention    Sensory Impairment  --   sensation grossly intant BUE  -AR       Light Touch LLE;RLE  -MJ        LLE Light Touch WNL  -MJ        RLE Light Touch WNL  -MJ        Positioning and Restraints    Pre-Treatment Position in bed  -MJ in bed  -AR       Post Treatment Position bed  -MJ bed  -AR       In Bed notified nsg;supine;call light within reach;encouraged to call for assist;with family/caregiver  -MJ notified nsg;supine;call light within reach;encouraged to call for assist;with family/caregiver  -AR         User Key  (r) = Recorded By, (t) = Taken By, (c) = Cosigned By    Initials Name Effective Dates    AR Varsha Cox, OT 06/22/15 -     WANG Smith, PT 03/14/16 -     OLGA Garcia RN 06/16/16 -            Occupational Therapy Education     Title: PT OT SLP Therapies (Active)     Topic: Occupational Therapy (Active)     Point: ADL training (Done)    Description: Instruct learner(s) on proper safety adaptation and remediation techniques during self care or  transfers.   Instruct in proper use of assistive devices.    Learning Progress Summary    Learner Readiness Method Response Comment Documented by Status   Patient Eager E,D,TB VU,NR Reviewd EC/WS, incorporation of PLB into ADL routine, role of OT, goals of care AR 04/20/17 1151 Done   Family Eager E,D,TB VU,NR Reviewd EC/WS, incorporation of PLB into ADL routine, role of OT, goals of care AR 04/20/17 1151 Done               Point: Precautions (Done)    Description: Instruct learner(s) on prescribed precautions during self-care and functional transfers.    Learning Progress Summary    Learner Readiness Method Response Comment Documented by Status   Patient Eager E,D,TB VU,NR Reviewd EC/WS, incorporation of PLB into ADL routine, role of OT, goals of care AR 04/20/17 1151 Done   Family Eager E,D,TB VU,NR Reviewd EC/WS, incorporation of PLB into ADL routine, role of OT, goals of care AR 04/20/17 1151 Done               Point: Body mechanics (Done)    Description: Instruct learner(s) on proper positioning and spine alignment during self-care, functional mobility activities and/or exercises.    Learning Progress Summary    Learner Readiness Method Response Comment Documented by Status   Patient Eager E,D,TB VU,NR Reviewd EC/WS, incorporation of PLB into ADL routine, role of OT, goals of care AR 04/20/17 1151 Done   Family Divyaer E,D,TB VU,NR Reviewd EC/WS, incorporation of PLB into ADL routine, role of OT, goals of care AR 04/20/17 1151 Done                      User Key     Initials Effective Dates Name Provider Type Discipline    AR 06/22/15 -  Varsha Cox OT Occupational Therapist OT                  OT Recommendation and Plan  Anticipated Equipment Needs At Discharge: raised toilet seat, tub bench  Anticipated Discharge Disposition: home with assist  Therapy Frequency: daily (per priority policy)  Plan of Care Review  Plan Of Care Reviewed With: patient, spouse, sibling  Progress: improving  Outcome  Summary/Follow up Plan: OT evalaution complete. Pt will benefit from continued skilled OT intervention for eduacation on EC/WS, incorporation of PLB, ADL retraining in preparation for safe DC home. Recommend DC home with assist from family based on current function.           OT Goals       04/20/17 1152          Transfer Training OT LTG    Transfer Training OT LTG, Date Established 04/20/17  -AR      Transfer Training OT LTG, Time to Achieve 1 wk  -AR      Transfer Training OT LTG, Activity Type sit to stand/stand to sit;toilet  -AR      Transfer Training OT LTG, [place] Level supervision required  -AR      Range of Motion OT LTG    Range of Motion Goal OT LTG, Date Established 04/20/17  -AR      Range of Motion Goal OT LTG, Time to Achieve 1 wk  -AR      Range of Motion Goal OT LTG, AROM Measure Pt will complete BUE AROM 2 sets x 10 reps to support ADL function   -AR      Patient Education OT LTG    Patient Education OT LTG, Date Established 04/20/17  -AR      Patient Education OT LTG, Time to Achieve 1 wk  -AR      Patient Education OT LTG, Education Type HEP;home safety;energy conservation;work simplification;adaptive breathing  -AR      Patient Education OT LTG, Education Understanding demonstrates adequately;verbalizes understanding  -AR      LB Dressing OT LTG    LB Dressing Goal OT LTG, Date Established 04/20/17  -AR      LB Dressing Goal OT LTG, Time to Achieve 1 wk  -AR      LB Dressing Goal OT LTG, Activity Type Pt will complete LB dressing taks   -AR      LB Dressing Goal OT LTG, [place] Level verbal cues required;supervision required  -AR        User Key  (r) = Recorded By, (t) = Taken By, (c) = Cosigned By    Initials Name Provider Type    MAGGI Cox, OT Occupational Therapist                Outcome Measures       04/20/17 1114 04/20/17 0113       How much help from another person do you currently need...    Turning from your back to your side while in flat bed without using  bedrails? 4  -MJ      Moving from lying on back to sitting on the side of a flat bed without bedrails? 4  -MJ      Moving to and from a bed to a chair (including a wheelchair)? 3  -MJ      Standing up from a chair using your arms (e.g., wheelchair, bedside chair)? 3  -MJ      Climbing 3-5 steps with a railing? 3  -MJ      To walk in hospital room? 3  -MJ      AM-PAC 6 Clicks Score 20  -MJ      How much help from another is currently needed...    Putting on and taking off regular lower body clothing?  3  -AR     Bathing (including washing, rinsing, and drying)  3  -AR     Toileting (which includes using toilet bed pan or urinal)  3  -AR     Putting on and taking off regular upper body clothing  3  -AR     Taking care of personal grooming (such as brushing teeth)  3  -AR     Eating meals  4  -AR     Score  19  -AR     Functional Assessment    Outcome Measure Options AM-PAC 6 Clicks Basic Mobility (PT)  -MJ AM-PAC 6 Clicks Daily Activity (OT)  -AR       User Key  (r) = Recorded By, (t) = Taken By, (c) = Cosigned By    Initials Name Provider Type    AR Varsha Cox OT Occupational Therapist    WANG Smith, PT Physical Therapist          Time Calculation:   OT Start Time: 1113    Therapy Charges for Today     Code Description Service Date Service Provider Modifiers Qty    [id number]  OT EVAL LOW COMPLEXITY 4 4/20/2017 Varsha Cox OT GO 1               Varsha Cox OT  4/20/2017

## 2017-04-20 NOTE — PROGRESS NOTES
Acute Care - Physical Therapy Initial Evaluation  [facility]     Patient Name: Jerod Zelaya  : 1940  MRN: [id number]  Today's Date: 2017   Onset of Illness/Injury or Date of Surgery Date: 17  Date of Referral to PT: 17  Referring Physician: Mayne, PA      Admit Date: 2017     Visit Dx:    ICD-10-CM ICD-9-CM   1. Impaired functional mobility, balance, gait, and endurance Z74.09 V49.89   2. Impaired mobility and ADLs Z74.09 799.89     Patient Active Problem List   Diagnosis   • Myelodysplasia, low grade   • CMML (chronic myelomonocytic leukemia)   • Thrombocytopenia   • Hypoxia    • Chronic fatigue   • Unintended weight loss   • Tobacco abuse   • FTT (failure to thrive) in adult   • Anemia   • Pericardial effusion with cardiac tamponade     Past Medical History:   Diagnosis Date   • Bleeding    • CMML (chronic myelomonocytic leukemia)    • Easy bruising    • H/O colonoscopy 2014   • Kidney stones    • Melanoma on left side of face     s/p XRT    • Myelodysplastic syndrome 2014    CMML, completed Chemo 2016   • Wears dentures    • Wears glasses      Past Surgical History:   Procedure Laterality Date   • BACK SURGERY     • CORONARY ARTERY BYPASS GRAFT            PT ASSESSMENT (last 72 hours)      PT Evaluation       17 1114 17 1113    Rehab Evaluation    Document Type evaluation  -MJ evaluation  -AR    Subjective Information agree to therapy;no complaints  -MJ no complaints;agree to therapy  -AR    Patient Effort, Rehab Treatment excellent  -MJ excellent  -AR    Symptoms Noted During/After Treatment fatigue;shortness of breath  -MJ shortness of breath   mild dyspnea at end of ambulation  -AR    General Information    Patient Profile Review yes  -MJ yes  -AR    Onset of Illness/Injury or Date of Surgery Date 17  -MJ 17  -AR    Referring Physician Mayne, PA  -WANG Eckert  -AR    General Observations Pt supine in bed, IV heplocked, tele, on RA.  Family present  - pt supine, family members at bedside  -AR    Pertinent History Of Current Problem Pt presents from MD office with low oxygen sats (70's), fatigue, dyspnea and weight loss over last few months.   - Pt is a 76 yom who presented to [facility] as direct admit from hematology/oncology office visit. Pt admitted for hypoxemia with RA sat of 77 in office, report of progressive fatigue x 3 months which is profound with minimal exertion, unintentional 45# wt loss x 18 months and severe anemia. Pt received 2 units PRBC overnight.    -AR    Precautions/Limitations fall precautions;other (see comments)   monitor vitals  - fall precautions  -AR    Prior Level of Function independent:;all household mobility;community mobility;gait;transfer;bed mobility   Pt fatigued easily, gait distance limited  - independent:;all household mobility;community mobility;gait;transfer;min assist:;ADL's   bathing 2x/week due to weakness, dyspnea with all activities  -AR    Equipment Currently Used at Home grab bar  - none  -AR    Plans/Goals Discussed With patient and family;agreed upon  - patient and family;agreed upon  -AR    Risks Reviewed patient and family:;LOB;increased discomfort  - patient and family:;nausea/vomiting;LOB;dizziness;increased discomfort;change in vital signs;lines disloged  -AR    Benefits Reviewed patient and family:;improve function;increase independence;increase strength;increase balance;decrease pain  - patient and family:;improve function;increase independence;increase strength;increase balance;decrease pain;decrease risk of DVT;increase knowledge  -AR    Barriers to Rehab medically complex  - none identified  -AR    Living Environment    Lives With spouse  - spouse  -AR    Living Arrangements [place]  - house  -AR    Home Accessibility tub/shower is not walk in  - bed and bath on same level;tub/shower is not walk in   pt does not access second floor of home  -AR    Transportation Available  " car;family or friend will provide  -AR    Living Environment Comment Pt lives first floor, does not have to perform steps  -MJ Pt spends winter in [place] and summer/fall in [place]. They just retured back to [place] last week. he lives with spouse who has been assisting him. He reports \"furniture walking\" and very limited mobility of a few feet to bathroom in his home.   -AR    Clinical Impression    Date of Referral to PT 04/20/17  -MJ     PT Diagnosis decreased functional mobility due to anemia  -MJ     Criteria for Skilled Therapeutic Interventions Met yes;treatment indicated  -MJ     Vital Signs    Pre Systolic BP Rehab 100  -  -AR    Pre Treatment Diastolic BP 65  -MJ 65  -AR    Intra Systolic BP Rehab 100  -MJ     Intra Treatment Diastolic BP 79  -MJ     Post Systolic BP Rehab  100  -AR    Post Treatment Diastolic BP  79  -AR    Pretreatment Heart Rate (beats/min) 75  -MJ 79  -AR    Posttreatment Heart Rate (beats/min) 94  -MJ 90  -AR    Pre SpO2 (%) 98  -MJ 97  -AR    O2 Delivery Pre Treatment room air  -MJ room air  -AR    Post SpO2 (%) 99  -  -AR    O2 Delivery Post Treatment room air  -MJ room air  -AR    Pre Patient Position Supine  -MJ     Intra Patient Position Sitting   Sitting EOB, denies dizziness  -MJ     Post Patient Position Supine  -MJ     Pain Assessment    Pain Assessment No/denies pain  -MJ No/denies pain  -AR    Vision Assessment/Intervention    Visual Impairment  WFL with corrective lenses  -AR    Cognitive Assessment/Intervention    Current Cognitive/Communication Assessment functional  -MJ functional  -AR    Orientation Status oriented x 4  -MJ oriented x 4  -AR    Follows Commands/Answers Questions 100% of the time;able to follow multi-step instructions;needs cueing  -% of the time;able to follow multi-step instructions  -AR    Personal Safety WNL/WFL  -MJ WNL/WFL  -AR    Personal Safety Interventions  gait belt;nonskid shoes/slippers when out of bed;supervised activity  -AR    ROM " (Range of Motion)    General ROM no range of motion deficits identified  -MJ no range of motion deficits identified   BUE  -AR    General ROM Detail for B-L LE; defer UE to OT  -MJ     MMT (Manual Muscle Testing)    General MMT Assessment no strength deficits identified  -MJ no strength deficits identified   BUE MMT 4+/5  -AR    General MMT Assessment Detail for B-L LE; defer UE to OT  -MJ     Bed Mobility, Assessment/Treatment    Bed Mobility, Assistive Device  bed rails;head of bed elevated  -AR    Bed Mobility, Scoot/Bridge, [place]  independent  -AR    Bed Mob, Supine to Sit, [place] independent  -MJ conditional independence  -AR    Bed Mob, Sit to Supine, [place] independent  -MJ conditional independence  -AR    Transfer Assessment/Treatment    Transfers, Sit-Stand [place] stand by assist  -MJ contact guard assist  -AR    Transfers, Stand-Sit [place] stand by assist  -MJ contact guard assist  -AR    Transfer, Impairments  impaired balance  -AR    Transfer, Comment  iitial mild retrograde posture, pt able to correct without assist  -AR    Gait Assessment/Treatment    Gait, [place] Level contact guard assist;verbal cues required  -MJ     Gait, Assistive Device --   none  -MJ     Gait, Distance (Feet) 146  -MJ     Gait, Gait Pattern Analysis swing-through gait  -MJ     Gait, Gait Deviations forward flexed posture;step length decreased  -MJ     Gait, Impairments --   decreased endurance  -MJ     Gait, Comment Pt demo step through gait pattern. Verbal cues for upright posture and to look straight ahead, improvement noted. Pt became SOA with increased distance, cues for PLB. Gait limited by fatigue  -MJ     Motor Skills/Interventions    Additional Documentation  Balance Skills Training (Group)  -AR    Balance Skills Training    Sitting-Level of Assistance  Independent  -AR    Sitting # of Minutes  8  -AR    Standing-Level of Assistance  Contact guard  -AR    Sensory  Assessment/Intervention    Sensory Impairment  --   sensation grossly intant BUE  -AR    Light Touch LLE;RLE  -MJ     LLE Light Touch WNL  -MJ     RLE Light Touch WNL  -MJ     Positioning and Restraints    Pre-Treatment Position in bed  -MJ in bed  -AR    Post Treatment Position bed  -MJ bed  -AR    In Bed notified nsg;supine;call light within reach;encouraged to call for assist;with family/caregiver  -MJ notified nsg;supine;call light within reach;encouraged to call for assist;with family/caregiver  -AR      04/19/17 1700       General Information    Equipment Currently Used at Home none  -AP     Living Environment    Lives With spouse  -AP     Living Arrangements house  -AP     Home Accessibility stairs to enter home;stairs within home  -AP     Stair Railings at Home none  -AP     Type of Financial/Environmental Concern none  -AP     Transportation Available car  -AP       User Key  (r) = Recorded By, (t) = Taken By, (c) = Cosigned By    Initials Name Provider Type    AR Varsha Cox, OT Occupational Therapist    WANG Smith, PT Physical Therapist    OLGA Garcia RN Registered Nurse          Physical Therapy Education     Title: PT OT SLP Therapies (Active)     Topic: Physical Therapy (Active)     Point: Mobility training (Done)    Learning Progress Summary    Learner Readiness Method Response Comment Documented by Status   Patient Acceptance E,D VU,NR   04/20/17 1147 Done   Significant Other Acceptance E,D VU,NR   04/20/17 1147 Done               Point: Body mechanics (Done)    Learning Progress Summary    Learner Readiness Method Response Comment Documented by Status   Patient Acceptance E,D VU,NR   04/20/17 1147 Done   Significant Other Acceptance E,D VU,NR   04/20/17 1147 Done               Point: Precautions (Done)    Learning Progress Summary    Learner Readiness Method Response Comment Documented by Status   Patient Acceptance E,D VU,NR   04/20/17 1147 Done   Significant Other  Acceptance E,D VU,NR   04/20/17 1147 Done                      User Key     Initials Effective Dates Name Provider Type Discipline     03/14/16 -  Andrea mSith, PT Physical Therapist PT                PT Recommendation and Plan  Anticipated Discharge Disposition: home with assist  Planned Therapy Interventions: balance training, gait training, home exercise program, patient/family education, strengthening, transfer training  PT Frequency: daily  Plan of Care Review  Plan Of Care Reviewed With: patient  Outcome Summary/Follow up Plan: PT eval completed. Pt ambulated 146 feet with CGA. Pt demo fatigue and decreased functional mobility below baseline. PT will follow to address deficits, work toward goals and promote return to PLOF with increased independence and safety. Recommend home with assist upon discharge          IP PT Goals       04/20/17 1147          Bed Mobility PT LTG    Bed Mobility PT LTG, Date Established 04/20/17  -MJ      Bed Mobility PT LTG, Time to Achieve 5 days  -MJ      Bed Mobility PT LTG, Activity Type supine to sit/sit to supine  -MJ      Bed Mobility PT LTG, [place] Level independent  -MJ      Bed Mobility PT LTG, Date Goal Reviewed 04/20/17  -MJ      Bed Mobility PT LTG, Outcome goal met  -MJ      Transfer Training PT LTG    Transfer Training PT LTG, Date Established 04/20/17  -MJ      Transfer Training PT LTG, Time to Achieve 5 days  -MJ      Transfer Training PT LTG, Activity Type sit to stand/stand to sit  -MJ      Transfer Training PT LTG, [place] Level independent  -MJ      Gait Training PT LTG    Gait Training Goal PT LTG, Date Established 04/20/17  -MJ      Gait Training Goal PT LTG, Time to Achieve 1 wk  -MJ      Gait Training Goal PT LTG, [place] Level independent  -MJ      Gait Training Goal PT LTG, Distance to Achieve 350 feet  -MJ        User Key  (r) = Recorded By, (t) = Taken By, (c) = Cosigned By    Initials Name Provider Type     Andrea Smith, PT Physical  Therapist                Outcome Measures       04/20/17 1114          How much help from another person do you currently need...    Turning from your back to your side while in flat bed without using bedrails? 4  -MJ      Moving from lying on back to sitting on the side of a flat bed without bedrails? 4  -MJ      Moving to and from a bed to a chair (including a wheelchair)? 3  -MJ      Standing up from a chair using your arms (e.g., wheelchair, bedside chair)? 3  -MJ      Climbing 3-5 steps with a railing? 3  -MJ      To walk in hospital room? 3  -MJ      AM-PAC 6 Clicks Score 20  -MJ      Functional Assessment    Outcome Measure Options AM-PAC 6 Clicks Basic Mobility (PT)  -        User Key  (r) = Recorded By, (t) = Taken By, (c) = Cosigned By    Initials Name Provider Type    WANG Smith PT Physical Therapist           Time Calculation:         PT Charges       04/20/17 1150          Time Calculation    Start Time 1114  -MJ      PT Received On 04/20/17  -      PT Goal Re-Cert Due Date 04/30/17  -      Time Calculation- PT    Total Timed Code Minutes- PT 0 minute(s)  -        User Key  (r) = Recorded By, (t) = Taken By, (c) = Cosigned By    Initials Name Provider Type    WANG Smith PT Physical Therapist          Therapy Charges for Today     Code Description Service Date Service Provider Modifiers Qty    10171356639 HC PT EVAL LOW COMPLEXITY 4 4/20/2017 Andrea Smith PT GP 1          PT G-Codes  Outcome Measure Options: AM-PAC 6 Clicks Basic Mobility (PT)      Andrea Smith PT  4/20/2017

## 2017-04-20 NOTE — PLAN OF CARE
Problem: Patient Care Overview (Adult)  Goal: Plan of Care Review  Outcome: Ongoing (interventions implemented as appropriate)    04/20/17 1152   Coping/Psychosocial Response Interventions   Plan Of Care Reviewed With patient;spouse;sibling   Patient Care Overview   Progress improving   Outcome Evaluation   Outcome Summary/Follow up Plan OT evalaution complete. Pt will benefit from continued skilled OT intervention for eduacation on EC/WS, incorporation of PLB, ADL retraining in preparation for safe DC home. Recommend DC home with assist from family based on current function.          Problem: Inpatient Occupational Therapy  Goal: Transfer Training Goal 1 LTG- OT  Outcome: Ongoing (interventions implemented as appropriate)    04/20/17 1152   Transfer Training OT LTG   Transfer Training OT LTG, Date Established 04/20/17   Transfer Training OT LTG, Time to Achieve 1 wk   Transfer Training OT LTG, Activity Type sit to stand/stand to sit;toilet   Transfer Training OT LTG, [place] Level supervision required       Goal: Range of Motion Goal LTG- OT  Outcome: Ongoing (interventions implemented as appropriate)    04/20/17 1152   Range of Motion OT LTG   Range of Motion Goal OT LTG, Date Established 04/20/17   Range of Motion Goal OT LTG, Time to Achieve 1 wk   Range of Motion Goal OT LTG, AROM Measure Pt will complete BUE AROM 2 sets x 10 reps to support ADL function        Goal: Patient Education Goal LTG- OT  Outcome: Ongoing (interventions implemented as appropriate)    04/20/17 1152   Patient Education OT LTG   Patient Education OT LTG, Date Established 04/20/17   Patient Education OT LTG, Time to Achieve 1 wk   Patient Education OT LTG, Education Type HEP;home safety;energy conservation;work simplification;adaptive breathing   Patient Education OT LTG, Education Understanding demonstrates adequately;verbalizes understanding       Goal: LB Dressing Goal LTG- OT  Outcome: Ongoing (interventions implemented as  appropriate)    04/20/17 1152   LB Dressing OT LTG   LB Dressing Goal OT LTG, Date Established 04/20/17   LB Dressing Goal OT LTG, Time to Achieve 1 wk   LB Dressing Goal OT LTG, Activity Type Pt will complete LB dressing taks    LB Dressing Goal OT LTG, [place] Level verbal cues required;supervision required

## 2017-04-20 NOTE — PLAN OF CARE
Problem: Patient Care Overview (Adult)  Goal: Plan of Care Review  Outcome: Ongoing (interventions implemented as appropriate)    04/20/17 1147   Coping/Psychosocial Response Interventions   Plan Of Care Reviewed With patient   Outcome Evaluation   Outcome Summary/Follow up Plan PT kartikal completed. Pt ambulated 146 feet with CGA. Pt demo fatigue and decreased functional mobility below baseline. PT will follow to address deficits, work toward goals and promote return to PLOF with increased independence and safety. Recommend home with assist upon discharge         Problem: Inpatient Physical Therapy  Goal: Bed Mobility Goal LTG- PT  Outcome: Outcome(s) achieved Date Met:  04/20/17 04/20/17 1147   Bed Mobility PT LTG   Bed Mobility PT LTG, Date Established 04/20/17   Bed Mobility PT LTG, Time to Achieve 5 days   Bed Mobility PT LTG, Activity Type supine to sit/sit to supine   Bed Mobility PT LTG, [place] Level independent   Bed Mobility PT LTG, Date Goal Reviewed 04/20/17   Bed Mobility PT LTG, Outcome goal met       Goal: Transfer Training Goal 1 LTG- PT  Outcome: Ongoing (interventions implemented as appropriate)    04/20/17 1147   Transfer Training PT LTG   Transfer Training PT LTG, Date Established 04/20/17   Transfer Training PT LTG, Time to Achieve 5 days   Transfer Training PT LTG, Activity Type sit to stand/stand to sit   Transfer Training PT LTG, [place] Level independent       Goal: Gait Training Goal LTG- PT  Outcome: Ongoing (interventions implemented as appropriate)    04/20/17 1147   Gait Training PT LTG   Gait Training Goal PT LTG, Date Established 04/20/17   Gait Training Goal PT LTG, Time to Achieve 1 wk   Gait Training Goal PT LTG, [place] Level independent   Gait Training Goal PT LTG, Distance to Achieve 350 feet

## 2017-04-20 NOTE — CONSULTS
Cardiothoracic Surgery History & Physical           Chief complaint: Shortness of air    HPI: Mr. Zelaya is a 76 year old  male with a history of CAD, and CMML with history of 30 radiation treatments in 2016 who presented to hematology oncology clinic on 4/19 for follow up with complaints of progressively worsening fatigue, nonproductive cough, weight loss (10-15 lbs in 3 mos) and shortness of air for the last 3 months.  Patient had oxygen saturation in 70s so was transferred to this facility for further work up.  Labs revealed hemoglobin and hematocrit of 6.4/22.1 yesterday evening so patient was subsequently transfused 2 units PRBC.  Patient underwent bone marrow biopsy this morning and ECHO which revealed pericardial effusion for which we have been asked to evaluate.      Past Medical History:   Diagnosis Date   • Bleeding    • CMML (chronic myelomonocytic leukemia)    • Easy bruising    • H/O colonoscopy 07/2014   • Kidney stones    • Melanoma on left side of face     s/p XRT 2016   • Myelodysplastic syndrome 12/2014    CMML, completed Chemo 6/2016   • Wears dentures    • Wears glasses      Past Surgical History:   Procedure Laterality Date   • BACK SURGERY     • CORONARY ARTERY BYPASS GRAFT  2002     Family History   Problem Relation Age of Onset   • Lung cancer Father    • No Known Problems Mother    • No Known Problems Sister    • No Known Problems Brother    • No Known Problems Daughter      Social History   Substance Use Topics   • Smoking status: Current Every Day Smoker     Packs/day: 1.00     Years: 60.00     Types: Cigarettes   • Smokeless tobacco: None   • Alcohol use No     Occupation: Retired [employer] employee  Lives in [place] half of year and [place] half of year with wife.  Prescriptions Prior to Admission   Medication Sig Dispense Refill Last Dose   • aspirin 81 MG EC tablet Take 81 mg by mouth daily.   Past Week at Unknown time   • isosorbide mononitrate (IMDUR) 30 MG 24 hr tablet     4/19/2017 at Unknown time   • Multiple Vitamins-Minerals (MULTIVITAMIN ADULT PO) Take 1 tablet by mouth daily.   4/19/2017 at Unknown time   • cyclobenzaprine (FLEXERIL) 10 MG tablet       • PROAIR  (90 BASE) MCG/ACT inhaler         Allergies:  Penicillins    Review of Systems:    A comprehensive review of systems was negative except for:   Constitutional: positive for fatigue, malaise and weight loss (10-15 lbs in 3 mos)  Respiratory: positive for cough and dyspnea on exertion  Cardiovascular: positive for cough, dyspnea and fatigue  Gastrointestinal: negative  Hematologic/lymphatic: positive for easy bruising  Musculoskeletal: positive for muscle weakness  Neurological: positive for dizziness, memory problems and weakness  Allergic/Immunologic: negative    All other systems were reviewed and are negative.    Vital Signs:  Temp:  [97.6 °F (36.4 °C)-98.6 °F (37 °C)] 98.5 °F (36.9 °C)  Heart Rate:  [73-96] 81  Resp:  [16-20] 20  BP: ()/(60-77) 106/76    Physical Exam:  Physical Exam   Constitutional: He is oriented to person, place, and time. He has a sickly appearance. No distress.   HENT:   Head: Normocephalic and atraumatic.   Eyes: EOM are normal. Pupils are equal, round, and reactive to light.   Neck: Normal range of motion.   Cardiovascular: Normal rate and regular rhythm.  Exam reveals distant heart sounds. Exam reveals no gallop and no friction rub.    No murmur heard.  Pulmonary/Chest: Effort normal and breath sounds normal. No stridor. No respiratory distress. He has no wheezes.   Abdominal: Soft. Bowel sounds are normal. He exhibits no distension.   Musculoskeletal: He exhibits no edema or deformity.   Neurological: He is alert and oriented to person, place, and time.   Skin: Skin is warm and dry. Bruising and ecchymosis noted.   Psychiatric: He has a normal mood and affect. His behavior is normal. Judgment and thought content normal.       Labs:    Results from last 7 days  Lab Units  04/20/17  1049   WBC 10*3/mm3 27.55*   HEMOGLOBIN g/dL 8.9*   HEMATOCRIT % 29.1*   PLATELETS 10*3/mm3 168       Results from last 7 days  Lab Units 04/20/17  1049   SODIUM mmol/L 137   POTASSIUM mmol/L 4.2   CHLORIDE mmol/L 110*   TOTAL CO2 mmol/L 23.0   BUN mg/dL 22   CREATININE mg/dL 1.40*   GLUCOSE mg/dL 94   CALCIUM mg/dL 8.8         Coagulation:   Lab Results   Component Value Date    INR 1.17 04/20/2017     Imaging: ECHO from 4/20 revealing EF of 60%, >2cm circumferential pericardial effusion with cardiac tamponade physiology and significant right ventricle diastolic collapse, left ventricle diastolic collapse, left atrial free wall collapse and right atrial free wall collapse.   Moderate aortic valve regurgitation, mild pulmonic and tricuspid valve regurgitation.       Assessment:   76 year old  male with significant pericardial effusion of unknown etiology.  His recent CT did not reveal a significant effusion and this is a new development.  He is status post CABG and a subxiphoid pericardial window or approach via VATS carries risk of injury to the heart due to adhesions.      Plan: Bedside ultrasound reveals a pericardial effusion that has some loculations but is amenable to percutaneous drainage.  The plan will be to proceed with pericardial drain placement at the bedside utilizing ultrasound guidance.          Vivi Randle PA-C  04/20/17  5:25 PM

## 2017-04-20 NOTE — PROGRESS NOTES
Discharge Planning Assessment  [facility]     Patient Name: Jerod Zelaya  MRN: [id number]  Today's Date: 4/20/2017    Admit Date: 4/19/2017          Discharge Needs Assessment       04/20/17 3879    Living Environment    Lives With spouse    Living Arrangements house    Provides Primary Care For no one    Quality Of Family Relationships supportive;helpful;involved    Able to Return to Prior Living Arrangements yes    Living Arrangement Comments Pt. stated that he lives in a split level home with his wife in [place], [place]. Pt. stated he has everything accessible to him on one floor and does not have to access any stairs.    Discharge Needs Assessment    Concerns To Be Addressed denies needs/concerns at this time    Readmission Within The Last 30 Days no previous admission in last 30 days    Equipment Currently Used at Home none    Equipment Needed After Discharge none    Transportation Available car;family or friend will provide    Discharge Disposition home or self-care    Discharge Contact Information if Applicable [phone]    Discharge Planning Comments Pt. plans to return home with his spouse when he is discharged from the hospital.  Pt.'s wife will drive him home.  Pt. does not use any DME and did not receive home health services prior to addmission.  CM discussed the need for home health services following discharge, but pt. declined stated he did not need anything.  CM will continue to follow.            Discharge Plan       04/20/17 1346    Case Management/Social Work Plan    Plan initial discharge planning    Patient/Family In Agreement With Plan yes    Additional Comments CM met with pt. at bedside; pt.'s wife was present with permission. Pt. denied having any discharge needs and stated he didn't need home health services at this time.  Pt.'s spouse will provide transportation.        Discharge Placement     No information found                Demographic Summary       04/20/17 1334    Referral  Information    Admission Type inpatient    Arrived From admitted as an inpatient    Primary Care Physician Information    Name Angel Underwood            Functional Status       04/20/17 5843    Employment/Financial    Employment/Finance Comments Pt. has insurance and prescription coverage with Medicare and AARP supplement.            Psychosocial     None            Abuse/Neglect     None            Legal     None            Substance Abuse     None            Patient Forms     None          KIMMIE Manzo

## 2017-04-20 NOTE — PROGRESS NOTES
[facility] Medicine Services  INPATIENT PROGRESS NOTE    Date of Admission: 4/19/2017  Length of Stay: 1  Primary Care Physician: Angel Underwood MD    Subjective   CC: f/u anemia   HPI:  Pt seen after undergoing echocardiogram this morning. He reports feeling some better after transfusion with ability to get up and walk to the bathroom without significant fatigue. No fevers, chills, dyspnea, or chest pain. Does note some mild cough that is stable. No bruising or bleeding. Awaiting bone marrow biopsy this morning.     Review Of Systems:   Review of Systems   Constitutional: Positive for fatigue. Negative for chills and fever.   Respiratory: Positive for cough. Negative for shortness of breath.    Cardiovascular: Negative for chest pain and palpitations.   Gastrointestinal: Negative for abdominal pain, nausea and vomiting.     Objective      Temp:  [97.6 °F (36.4 °C)-98.6 °F (37 °C)] 98.5 °F (36.9 °C)  Heart Rate:  [] 74  Resp:  [16-22] 18  BP: ()/(42-77) 106/76  Physical Exam  Constitutional: no acute distress, awake, alert, chronically ill appearing   Respiratory: Mild crackles to bases, nonlabored respirations   Cardiovascular: RRR, no murmurs, rubs, or gallops, palpable pedal pulses bilaterally  Gastrointestinal: Positive bowel sounds, soft, nontender, nondistended  Musculoskeletal: No bilateral ankle edema  Psychiatric: oriented x 3, appropriate affect, cooperative  Neurologic: Strength symmetric in all extremities     Results Review:    I have reviewed the labs, radiology results and diagnostic studies.      Results from last 7 days  Lab Units 04/19/17  1756   WBC 10*3/mm3 27.31*   HEMOGLOBIN g/dL 6.4*   PLATELETS 10*3/mm3 209       Results from last 7 days  Lab Units 04/19/17  1756   SODIUM mmol/L 140   POTASSIUM mmol/L 4.2   CHLORIDE mmol/L 111*   TOTAL CO2 mmol/L 15.0*   BUN mg/dL 22   CREATININE mg/dL 1.60*   GLUCOSE mg/dL 162*   CALCIUM mg/dL 9.4     Repeat labs this  AM pending.     Culture Data: Cultures: n/a     Radiology Data:   CT Chest without contrast personally reviewed and interpreted as chronic emphysematous changes, mild LLL airspace disease with trace pleural effusion. Agree with official interp. No dominant masses. Curiously, when reviewed, there is no evidence of pericardial effusion    Echo reviewed and discussed with Dr. Marshall  Interpretation Summary      · Left ventricular function is normal. Estimated EF = 60%.  · Left atrial cavity size is mildly dilated.  · Moderate tricuspid valve regurgitation is present.  · Mild pulmonic valve regurgitation is present.  · Moderate aortic valve regurgitation is present.  · large circumferential pericardial effusion with tamponade physiology with significant RV RA diastolic collapse but also left cardiac chambers collapse         I have reviewed the medications.    Assessment/Plan   Patient is a 76-year-old male with a past history of MDS, CMML, CKD III (past Cr 1.8-1.9) chronic thrombocytopenia, melanoma, CAD with history of CABG, and nephrolithiasis who is admitted as a direct request of Dr. Alves for evaluation of acute hypoxia with room air oxygen saturation of 77% while attending an outpatient appointment (Room air sats normal on arrival here). Subsequent evaluation disclosed acute anemia and large pericardial effusion with diastolic collapse of the LV and RV concerning for tamponade:     Problem List  Hospital Problem List     * (Principal)Hypoxia     Anemia    Pericardial effusion with cardiac tamponade    Myelodysplasia, low grade    CMML (chronic myelomonocytic leukemia)    Overview Signed 10/4/2016  1:08 PM by Apple Velez                Thrombocytopenia    Overview Signed 10/4/2016  1:08 PM by Apple Velez              Chronic fatigue    Unintended weight loss    Tobacco abuse    FTT (failure to thrive) in adult        Assessment/Plan:  - Pt is s/p 2 units PRBCs with repeat Hgb pending, no signs of  active GI Blood loss  - Remains without hypoxia at rest   - CT Chest with chronic lung disease and only trivial LLL airspace disease   - His exertional dyspnea and fatigue was likely due to anemia with concern for worsening MDS/leukemia  - Plan for BM Bx today, d/w Dr. Austin  - Echo with surprising finding of large pericardial effusion with tamponade physiology. Curiously, he does not have evidence of a pericardial effusion on his CT Chest and I wonder if this developed after he received transfusion. I have called and discussed with Dr. Pop who will see patient. I discussed probable need for pericardiocentesis and subsequent ICU observation with pt and wife.  - NPO now, check coags   - D/c aspirin  - Eventual plan for PT/OT eval given functional decline  - Dietician consulted     DVT prophylaxis: mechanical only  Discharge Planning: I expect patient to be discharged to home in to be determined days.    Aman Eckert MD   04/20/17   11:01 AM    Please note that portions of this note may have been completed with a voice recognition program. Efforts were made to edit the dictations, but occasionally words are mistranscribed.

## 2017-04-20 NOTE — PROGRESS NOTES
"Adult Nutrition  Assessment/PES    Patient Name:  Jerod Zelaya  YOB: 1940  MRN: [id number]  Admit Date:  4/19/2017    Assessment Date:  4/20/2017        Reason for Assessment       04/20/17 1146    Reason for Assessment    Reason For Assessment/Visit identified at risk by screening criteria    Identified At Risk By Screening Criteria MST SCORE 2+    Time Spent (min) 15    Diagnosis Diagnosis    Cardiac CABG   pericardial effusion; CABG (2002)    Hematological Thrombocytopenia;Other (comment)   myelodysplasia, low grade    Oncology Leukemia   chronic myelomonocytic leukemia s/p chemo completed 6/2016    Pulmonary/Critical Care Hypoxemia    Renal CKD   stage 3    Substance Use Tobacco    Other diagnosis failure to thrive              Nutrition/Diet History       04/20/17 1151    Nutrition/Diet History    Reported/Observed By Patient;Family    Other Patient and family in room at time of visit. Reports approximate ~30# weight loss over year and a half. Patient states his UBW was around 174#, last time he weighed that was approximately 2 years ago. He states that his appetite was good during this time, \"I was eating like a horse.\" Only just this past week has he noticed a decline in his appetite. Family in room on phone with MD at time of visit. Gave menu            Anthropometrics       04/20/17 1153    Anthropometrics    Height 182.9 cm (72\")    Weight 59 kg (130 lb 1.1 oz)    Ideal Body Weight (IBW)    Ideal Body Weight (IBW), Male (kg) 82.07    % Ideal Body Weight 72.04    Usual Body Weight (UBW)    Usual Body Weight 78.9 kg (174 lb)    % Usual Body Weight 74.75    Weight Loss 13.6 kg (30 lb)    % Weight Loss  17.2 %    Weight Loss Time Frame 1 and 1/2 years    Body Mass Index (BMI)    BMI (kg/m2) 17.68            Labs/Tests/Procedures/Meds       04/20/17 1204    Labs/Tests/Procedures/Meds    Labs/Tests Review Reviewed                Nutrition Prescription Ordered       04/20/17 1204    Nutrition " Prescription PO    Current PO Diet NPO            Evaluation of Received Nutrient/Fluid Intake       04/20/17 1221    PO Evaluation    Number of Days PO Intake Evaluated --   NPO    Number of Meals 1    % PO Intake 50%   PO intake prior to NPO            Problem/Interventions:        Problem 1       04/20/17 1221    Nutrition Diagnoses Problem 1    Problem 1 Unintended Weight Loss    Etiology (related to) Medical Diagnosis   clinical condition    Signs/Symptoms (evidenced by) Unintended Weight Change    Unintended Weight Change Loss    Number of Pounds Lost 30 lbs    Weight loss time period 1 1/2 years                Intervention Goal       04/20/17 1223    Intervention Goal    General Nutrition support treatment    PO Initiate feeding            Nutrition Intervention       04/20/17 1223    Nutrition Intervention    RD/Tech Action Follow Tx progress;Care plan reviewd;Advise alternate selection;Menu provided   patient was on regular diet at time of visit              Education/Evaluation       04/20/17 1223    Monitor/Evaluation    Monitor Per protocol;PO intake   once diet reinstated        Electronically signed by:  Elaine Kyle  04/20/17 12:24 PM

## 2017-04-20 NOTE — PLAN OF CARE
Problem: Patient Care Overview (Adult)  Goal: Plan of Care Review  Outcome: Ongoing (interventions implemented as appropriate)  Goal: Adult Individualization and Mutuality  Outcome: Ongoing (interventions implemented as appropriate)  Goal: Discharge Needs Assessment  Outcome: Ongoing (interventions implemented as appropriate)    Problem: Activity Intolerance (Adult)  Goal: Identify Related Risk Factors and Signs and Symptoms  Outcome: Ongoing (interventions implemented as appropriate)  Goal: Activity Tolerance  Outcome: Ongoing (interventions implemented as appropriate)  Goal: Effective Energy Conservation Techniques  Outcome: Ongoing (interventions implemented as appropriate)    Problem: Skin Integrity Impairment, Risk/Actual (Adult)  Goal: Identify Related Risk Factors and Signs and Symptoms  Outcome: Ongoing (interventions implemented as appropriate)  Goal: Skin Integrity/Wound Healing  Outcome: Ongoing (interventions implemented as appropriate)    Problem: Pressure Ulcer (Adult)  Goal: Signs and Symptoms of Listed Potential Problems Will be Absent or Manageable (Pressure Ulcer)  Outcome: Ongoing (interventions implemented as appropriate)

## 2017-04-20 NOTE — CONSULTS
"S: 76-year-old gentleman with CMML presents with worsening shortness of breath and fatigue.  I admitted him to the hospital to see what was going on.  He was found to be profoundly anemic with a hemoglobin of 6.4.  He also has leukocytosis with a white count of 22.  He denies any fevers or other issues ongoing at this time.  He received 2 units of blood overnight.  Still fairly weak and fatigued.    Past medical history, social history and family history was reviewed and unchanged from prior visit.    Review of Systems:    Review of Systems   A comprehensive 14 point review of systems was performed and was negative except as mentioned.      Medications:  The current medication list was reviewed in the EMR    ALLERGIES:    Allergies   Allergen Reactions   • Penicillins          Physical Exam    VITAL SIGNS:  /72 (BP Location: Left arm, Patient Position: Lying)  Pulse 74  Temp 98.5 °F (36.9 °C)  Resp 18  Ht 72\" (182.9 cm)  Wt 129 lb 8 oz (58.7 kg)  SpO2 100%  BMI 17.56 kg/m2     Performance Status: 1    Physical Exam    General:  in no acute distress  HEENT: sclera anicteric, oropharynx clear, neck is supple  Lymphatics: no cervical, supraclavicular, or axillary adenopathy  Cardiovascular: regular rate and rhythm, no murmurs, rubs or gallops  Lungs: clear to auscultation bilaterally  Abdomen: soft, nontender, nondistended.  No palpable organomegaly  Extremities: no lower extremity edema  Skin: no rashes, lesions, bruising, or petechiae  Msk:  Shows no weakness of the large muscle groups  Psych: Mood is stable        RECENT LABS:    Lab Results   Component Value Date    WBC 27.31 (H) 04/19/2017    HGB 6.4 (L) 04/19/2017    HCT 22.1 (L) 04/19/2017    MCV 95.3 04/19/2017     04/19/2017       Results from last 7 days  Lab Units 04/19/17  1756   SODIUM mmol/L 140   POTASSIUM mmol/L 4.2   CHLORIDE mmol/L 111*   TOTAL CO2 mmol/L 15.0*   BUN mg/dL 22   CREATININE mg/dL 1.60*   CALCIUM mg/dL 9.4   BILIRUBIN " mg/dL 0.5   ALK PHOS U/L 58   ALT (SGPT) U/L 25   AST (SGOT) U/L 38*   GLUCOSE mg/dL 162*       Assessment/Plan    77 yo gentleman with CMML.  Likely Progression of his disease to worsening MDS . Plan for Bone marrow Biopsy today.  WIll need transfusion support as an outpt. Can be discharged home today after bone marrow to follow up with me next week.       jennifer Alves MD  [facility] Hematology [facility] On[facility]    4/20/2017     Please note that portions of this note may have been completed with a voice recognition program. Efforts were made to edit the dictations, but occasionally words are mistranscribed.

## 2017-04-20 NOTE — CONSULTS
[facility] Consult Note      Patient Care Team:  Angel Underwood MD as PCP - General (Family Medicine)  Deniz Alves MD as PCP - Claims Attributed - PLEASE DO NOT REMOVE  MD Joselyn Agrawal Arvinda, MD    Subjective     History of Present Illness:  Mr. Zelaya is a very pleasant 76-year-old male with a known history of CAD.  He is status post remote CABG. His last heart catheterization was in September 2014 which revealed 3 patent bypass grafts.  Mr. Zelaya also has a history of MDS/CMML. He was admitted to [facility] on 4/19/17 secondary to a feeling of being washed out and very weak.  He was found to be markedly anemic and received 2 units of packed red blood cells.  A 2-D echocardiogram reveals a large circumferential pericardial effusion with tamponade physiology. We have been asked to see Mr. Zelaya due to his pericardial effusion. He is currently denying any chest pain, shortness of breath, or palpitations.  He says that he feels fine as long as he is lying down.      Current Facility-Administered Medications:   •  acetaminophen (TYLENOL) tablet 650 mg, 650 mg, Oral, Q4H PRN, Casie M Mayne, PA-C  •  ipratropium-albuterol (DUO-NEB) nebulizer solution 3 mL, 3 mL, Nebulization, 4x Daily - RT, Casie M Mayne, PA-C, 3 mL at 04/19/17 2109  •  isosorbide mononitrate (IMDUR) 24 hr tablet 30 mg, 30 mg, Oral, Q24H, Casie M Mayne, PA-C, 30 mg at 04/20/17 0803  •  nicotine (NICODERM CQ) 14 MG/24HR patch 1 patch, 1 patch, Transdermal, Q24H, Casie M Mayne, PA-C, 1 patch at 04/19/17 2035  •  ondansetron (ZOFRAN) tablet 4 mg, 4 mg, Oral, Q6H PRN **OR** ondansetron (ZOFRAN) injection 4 mg, 4 mg, Intravenous, Q6H PRN, Casie M Mayne, PA-C  •  sodium chloride 0.9 % flush 1-10 mL, 1-10 mL, Intravenous, PRN, Casie M Mayne, PA-C    Social History     Social History   • Marital status:      Spouse name: N/A   • Number of children: N/A   • Years  of education: N/A     Occupational History   • Not on file.     Social History Main Topics   • Smoking status: Current Every Day Smoker     Packs/day: 1.00     Years: 60.00     Types: Cigarettes   • Smokeless tobacco: Not on file   • Alcohol use No   • Drug use: No   • Sexual activity: Not on file     Other Topics Concern   • Not on file     Social History Narrative    Lives with wife in [place], [place]       Family History   Problem Relation Age of Onset   • Lung cancer Father    • No Known Problems Mother    • No Known Problems Sister    • No Known Problems Brother    • No Known Problems Daughter        Review of Systems   Constitutional: Positive for activity change, fatigue and unexpected weight change.   HENT: Negative.    Eyes: Negative.    Respiratory: Positive for shortness of breath.    Cardiovascular: Negative.    Gastrointestinal: Negative.    Endocrine: Negative.    Genitourinary: Negative.    Musculoskeletal: Positive for arthralgias.   Skin: Negative.    Allergic/Immunologic: Negative.    Neurological: Positive for dizziness and weakness.   Hematological: Bruises/bleeds easily.   Psychiatric/Behavioral: Negative.           Objective     Vital Signs  Temp:  [97.6 °F (36.4 °C)-98.6 °F (37 °C)] 98.5 °F (36.9 °C)  Heart Rate:  [] 74  Resp:  [16-22] 18  BP: ()/(42-77) 106/76      Intake/Output Summary (Last 24 hours) at 04/20/17 1214  Last data filed at 04/20/17 0800   Gross per 24 hour   Intake          1016.67 ml   Output                0 ml   Net          1016.67 ml     I/O this shift:  In: 300 [P.O.:300]  Out: -   Physical Exam   Constitutional: He is oriented to person, place, and time. He appears well-developed and well-nourished.   HENT:   Head: Normocephalic and atraumatic.   Eyes: Conjunctivae and EOM are normal. Pupils are equal, round, and reactive to light.   Neck: Normal range of motion. Neck supple. No JVD present. No thyromegaly present.   Cardiovascular: Normal rate, regular  rhythm and normal heart sounds.  Exam reveals no gallop and no friction rub.    No murmur heard.  Pulmonary/Chest: Effort normal and breath sounds normal. He has no wheezes. He has no rales.   Abdominal: Soft. Bowel sounds are normal. He exhibits no distension. There is no tenderness.   Musculoskeletal: Normal range of motion. He exhibits no edema.   Neurological: He is alert and oriented to person, place, and time.   Skin: Skin is warm and dry.   Psychiatric: He has a normal mood and affect.             Results Review:    I reviewed the patient's new clinical results.    WBC WBC   Date/Time Value Ref Range Status   04/20/2017 1049 27.82 (H) 3.50 - 10.80 10*3/mm3 Preliminary   04/19/2017 1756 27.31 (H) 3.50 - 10.80 10*3/mm3 Final      HGB Hemoglobin   Date/Time Value Ref Range Status   04/20/2017 1049 8.8 (L) 13.1 - 17.5 g/dL Preliminary   04/19/2017 1756 6.4 (L) 13.1 - 17.5 g/dL Final      HCT Hematocrit   Date/Time Value Ref Range Status   04/20/2017 1049 29.0 (L) 38.9 - 50.9 % Preliminary   04/19/2017 1756 22.1 (L) 38.9 - 50.9 % Final      Platlets No results found for: LABPLAT     PT/INR:    No results found for: PROTIME/No results found for: INR    Sodium Sodium   Date/Time Value Ref Range Status   04/20/2017 1049 137 132 - 146 mmol/L Final   04/19/2017 1756 140 132 - 146 mmol/L Final      Potassium Potassium   Date/Time Value Ref Range Status   04/20/2017 1049 4.2 3.5 - 5.5 mmol/L Final   04/19/2017 1756 4.2 3.5 - 5.5 mmol/L Final      Chloride Chloride   Date/Time Value Ref Range Status   04/20/2017 1049 110 (H) 99 - 109 mmol/L Final   04/19/2017 1756 111 (H) 99 - 109 mmol/L Final      Bicarbonate No results found for: PLASMABICARB   BUN BUN   Date/Time Value Ref Range Status   04/20/2017 1049 22 9 - 23 mg/dL Final   04/19/2017 1756 22 9 - 23 mg/dL Final      Creatinine Creatinine   Date/Time Value Ref Range Status   04/20/2017 1049 1.40 (H) 0.60 - 1.30 mg/dL Final   04/19/2017 1756 1.60 (H) 0.60 - 1.30 mg/dL  Final      Calcium Calcium   Date/Time Value Ref Range Status   04/20/2017 1049 8.8 8.7 - 10.4 mg/dL Final   04/19/2017 1756 9.4 8.7 - 10.4 mg/dL Final      Magnesium No results found for: MG   Troponin       0.017                                              EKG: normal sinus rhythm.    Assessment/Plan   Patient Active Problem List   Diagnosis   • Myelodysplasia, low grade   • CMML (chronic myelomonocytic leukemia)   • Thrombocytopenia   • Hypoxia    • Chronic fatigue   • Unintended weight loss   • Tobacco abuse   • FTT (failure to thrive) in adult   • Anemia   • Pericardial effusion with cardiac tamponade     Consult CT surgery for possible pericardial window    I discussed the patients findings and my recommendations with patient, family and nursing staff    FERNANDO Ghotra  04/20/17  12:14 PM

## 2017-04-21 NOTE — PLAN OF CARE
Problem: Patient Care Overview (Adult)  Goal: Plan of Care Review  Outcome: Ongoing (interventions implemented as appropriate)    04/21/17 1551   Coping/Psychosocial Response Interventions   Plan Of Care Reviewed With patient   Patient Care Overview   Progress improving   Outcome Evaluation   Outcome Summary/Follow up Plan Pt tolerated 1 set of UE/LE ex's this date with fatigue and SOA end of session. Continue OT To address functional I and endurance.          Problem: Inpatient Occupational Therapy  Goal: Transfer Training Goal 1 LTG- OT  Outcome: Ongoing (interventions implemented as appropriate)    04/20/17 1152 04/21/17 1551   Transfer Training OT LTG   Transfer Training OT LTG, Date Established 04/20/17 --    Transfer Training OT LTG, Time to Achieve 1 wk --    Transfer Training OT LTG, Activity Type sit to stand/stand to sit;toilet --    Transfer Training OT LTG, [place] Level supervision required --    Transfer Training OT LTG, Outcome --  goal ongoing       Goal: Range of Motion Goal LTG- OT  Outcome: Ongoing (interventions implemented as appropriate)    04/20/17 1152 04/21/17 1551   Range of Motion OT LTG   Range of Motion Goal OT LTG, Date Established 04/20/17 --    Range of Motion Goal OT LTG, Time to Achieve 1 wk --    Range of Motion Goal OT LTG, AROM Measure Pt will complete BUE AROM 2 sets x 10 reps to support ADL function  --    Range of Motion Goal OT LTG, Outcome --  goal ongoing       Goal: Patient Education Goal LTG- OT  Outcome: Ongoing (interventions implemented as appropriate)    04/20/17 1152 04/21/17 1551   Patient Education OT LTG   Patient Education OT LTG, Date Established 04/20/17 --    Patient Education OT LTG, Time to Achieve 1 wk --    Patient Education OT LTG, Education Type HEP;home safety;energy conservation;work simplification;adaptive breathing --    Patient Education OT LTG, Education Understanding demonstrates adequately;verbalizes understanding --    Patient Education OT  LTG Outcome --  goal ongoing       Goal: LB Dressing Goal LTG- OT  Outcome: Ongoing (interventions implemented as appropriate)    04/20/17 1152 04/21/17 1551   LB Dressing OT LTG   LB Dressing Goal OT LTG, Date Established 04/20/17 --    LB Dressing Goal OT LTG, Time to Achieve 1 wk --    LB Dressing Goal OT LTG, Activity Type Pt will complete LB dressing taks  --    LB Dressing Goal OT LTG, [place] Level verbal cues required;supervision required --    LB Dressing Goal OT LTG, Outcome --  goal ongoing

## 2017-04-21 NOTE — PROGRESS NOTES
CTS Progress Note      POD 1 s/p Pericardiocentesis        LOS: 2 days      Chief complaint: Shortness of breath    Subjective  Doing well this am, states shoulder pain resolved last night.     Objective    Vital Signs  Temp:  [98.5 °F (36.9 °C)-99 °F (37.2 °C)] 98.9 °F (37.2 °C)  Heart Rate:  [76-87] 76  Resp:  [14-20] 14  BP: (105-131)/(63-76) 112/63    Physical Exam:   General Appearance: alert, appears stated age and cooperative   Lungs:  Decreased lung sounds vick   Heart: regular rhythm & normal rate, normal S1, S2 and no murmur, no richelle, no rub   Skin: Incision c/d/i     CT - 35cc / 12hrs     Results     Results from last 7 days  Lab Units 04/20/17  1818 04/20/17  1049   WBC 10*3/mm3  --  27.55*   HEMOGLOBIN g/dL 8.5* 8.9*   HEMATOCRIT % 28.2* 29.1*   PLATELETS 10*3/mm3  --  168       Results from last 7 days  Lab Units 04/20/17  1049   SODIUM mmol/L 137   POTASSIUM mmol/L 4.2   CHLORIDE mmol/L 110*   TOTAL CO2 mmol/L 23.0   BUN mg/dL 22   CREATININE mg/dL 1.40*   GLUCOSE mg/dL 94   CALCIUM mg/dL 8.8       Imaging Results (last 24 hours)     Procedure Component Value Units Date/Time    XR Chest PA & Lateral [[id number]] Collected:  04/20/17 0943     Updated:  04/20/17 1008    Narrative:       EXAMINATION: XR CHEST PA AND LATERAL- 04/19/2017     INDICATION: dyspnea, hypoxia      COMPARISON: NONE     FINDINGS: PA and lateral views of the chest reveal heart to be  borderline enlarged. The lung fields are grossly clear. No focal  parenchymal opacification present. No pleural effusion or pneumothorax.  Degenerative changes seen within the spine. Pulmonary vascularity is  within normal limits. Vascular calcifications are present.           Impression:       Underlying chronic changes identified within the brain with  no acute intracranial abnormality present.     D:  04/20/2017  E:  04/20/2017       CT Chest Without Contrast [[id number]] Collected:  04/20/17 1055     Updated:  04/20/17 1619    Narrative:        EXAMINATION: CT CHEST WO CONTRAST-      INDICATION: Hypoxia, evaluate for parenchymal disease or mass.     TECHNIQUE: CT data set of the chest and mediastinum was performed  without intravenous contrast.     The radiation dose reduction device was turned on for each scan per the  ALARA (As Low as Reasonably Achievable) protocol.     COMPARISON: Compared to chest radiographs of earlier today, 04/19/2017.     FINDINGS:   1. The patient is significantly wasted with reduced complement of  muscular and soft tissues.     2. There is centrilobular emphysema diffusely. There is obstructive lung  disease with scattered micronodular postinflammatory scarring.     In addition, however, there is a small effusion at the left lung base  and there is linear stranding and opacity at the left lung base  posteriorly. The right lung is essentially otherwise clear.     3. The thoracic inlet is unremarkable. Mediastinal mass is not  identified. Scattered normal lymph nodes are seen in the aortopulmonary  window. Axillae are clear with scattered normal physiologic lymph nodes.   The cardiac chambers are borderline for size. There is no pericardial  effusion.     4. The images into the upper abdomen are nonrevealing with the exception  of mild splenomegaly.       Impression:       1. Centrilobular emphysema and obstructive lung disease is noted with  postinflammatory micronodular scarring diffusely.  2. Otherwise, there is a trace effusion left base with mild linear  nodular stranding airspace opacity in the left lower lobe. There is mild  cardiomegaly without pericardial effusion. There is mild splenomegaly.  3. Overall, the active parenchymal disease at the left base is minimal,  however, global centrilobular emphysema and obstructive lung  abnormalities are impressive and diffuse.     D:  04/20/2017  E:  04/20/2017     This report was finalized on 4/20/2017 4:17 PM by Dr. Angel Alvarez MD.       XR Chest 1 View [[id number]] Updated:   04/20/17 2044    XR Chest 1 View [[id number]] Updated:  04/21/17 0547          Assessment  PPD #1 Pericardiocentesis drain placement for effusion and tamponade    Plan   Ambulate  Pulm toilet  CT management - CT to suction  Output from drain under whelming.  Likely loculated effusion

## 2017-04-21 NOTE — PLAN OF CARE
Problem: Patient Care Overview (Adult)  Goal: Plan of Care Review  Outcome: Ongoing (interventions implemented as appropriate)    04/21/17 0427   Coping/Psychosocial Response Interventions   Plan Of Care Reviewed With patient   Patient Care Overview   Progress progress toward functional goals as expected         Problem: Skin Integrity Impairment, Risk/Actual (Adult)  Goal: Skin Integrity/Wound Healing  Outcome: Ongoing (interventions implemented as appropriate)    04/21/17 0427   Skin Integrity Impairment, Risk/Actual (Adult)   Skin Integrity/Wound Healing making progress toward outcome

## 2017-04-21 NOTE — OP NOTE
DATE OF PROCEDURE:  04/20/2017    PREOPERATIVE DIAGNOSES:  1.  Pericardial effusion with tamponade.  2.  Chronic myelomonocytic leukemia.   3.  History of coronary artery disease, status post coronary artery bypass graft.  4.  Tobacco abuse.     POSTOPERATIVE DIAGNOSES:  1.  Pericardial effusion with tamponade.  2.  Chronic myelomonocytic leukemia.   3.  History of coronary artery disease, status post coronary artery bypass graft.  4.  Tobacco abuse.     PROCEDURE PERFORMED:  Pericardiocentesis.    SURGEON: Justin Bass MD    ASSISTANT: Tatyana Marte PA-C     ANESTHESIA: 1% lidocaine.     SPECIMENS: None.     INDICATIONS: The patient is a 76-year-old  male with a history of the CMML who presented with worsening fatigue and shortness of breath. The patient was undergoing evaluation for anemia and was transfused 2 units of PRBCs. On 04/19/2017, the patient had a CT scan of the chest that did not reveal evidence of pericardial effusion. An echocardiogram was performed on 04/20/2017 that revealed a 2 cm pericardial effusion with evidence of tamponade.  It was felt that he warranted pericardiocentesis. I felt that he was a high risk for a pericardial window given his previous history of CABG. The risks and benefits of the procedure were discussed with the patient and his family including pain, bleeding, infection, and adequate drainage with pericardiocentesis and drain placement within the heart.  The patient understood these risks and wished to proceed with surgery.     DESCRIPTION OF PROCEDURE: Bedside ultrasound was performed, which revealed pericardial effusion. The abdomen and chest were prepped and draped in the usual sterile fashion. A timeout was performed, including patient's name and procedure. Under ultrasound-guidance, the pericardial effusion was identified. Then, 1% lidocaine was administered in the subxiphoid space and needle access of the pericardial effusion was obtained with a yield of  very dark blood. The wire was easily advanced within the pericardial fluid. The wire was visualized within the pericardium under ultrasound. The tract was then dilated over wire using modified Seldinger technique and a 14-Yi Charles catheter pigtail was placed using modified Seldinger technique. This was connected to Pleur-evac suction and secured using a 0 silk suture. The patient tolerated the procedure well and was subsequently transported to his room in stable condition.       Justin Bass M.D.  FOREIGN/holly  DD: 04/21/2017 06:57:33  DT: 04/21/2017 09:10:38  Voice Rec. ID #[id number]  Voice Original ID #[id number]  Doc ID #[id number]  Rev. #0  cc:

## 2017-04-21 NOTE — PROGRESS NOTES
[facility]       LOS: 2 days   Patient Care Team:  Angel Underwood MD as PCP - General (Family Medicine)  Deniz Alves MD as PCP - Claims Attributed - PLEASE DO NOT REMOVE        Subjective       Patient Denies:  Cp, sob, palps      Vital Signs  Temp:  [97.4 °F (36.3 °C)-99 °F (37.2 °C)] 97.4 °F (36.3 °C)  Heart Rate:  [76-89] 89  Resp:  [14-20] 18  BP: (105-140)/(63-84) 140/84    Intake/Output Summary (Last 24 hours) at 04/21/17 0916  Last data filed at 04/21/17 0819   Gross per 24 hour   Intake              200 ml   Output              485 ml   Net             -285 ml     I/O this shift:  In: -   Out: 150 [Urine:150]    Physical Exam:     General Appearance:    Alert, cooperative, in no acute distress       Neck:   No adenopathy, supple, trachea midline, no JVD       Lungs:     Decreased at bases, respirations regular, even and                  unlabored    Heart:    Regular rhythm and normal rate, normal S1 and S2, no            murmur, no gallop, no rub, no click   Chest Wall:    No abnormalities observed   Abdomen:     Normal bowel sounds, no masses, no organomegaly, soft               Extremities:   Moves all extremities well, no edema, no cyanosis, no             redness   Pulses:   Pulses palpable and equal bilaterally     Results Review:     I reviewed the patient's new clinical results.      WBC WBC   Date/Time Value Ref Range Status   04/20/2017 1049 27.55 (H) 3.50 - 10.80 10*3/mm3 Final     Comment:     Corrected result. Previous result was 27.82 10*3/mm3 on 4/20/2017 at 1140 EDT   04/19/2017 1756 27.31 (H) 3.50 - 10.80 10*3/mm3 Final            HGB Hemoglobin   Date/Time Value Ref Range Status   04/20/2017 1818 8.5 (L) 13.1 - 17.5 g/dL Final   04/20/2017 1049 8.9 (L) 13.1 - 17.5 g/dL Final     Comment:     Corrected result. Previous result was 8.8 g/dL on 4/20/2017 at 1140 EDT   04/19/2017 1756 6.4 (L) 13.1 - 17.5 g/dL Final            HCT Hematocrit   Date/Time Value Ref Range Status   04/20/2017 1818 28.2 (L) 38.9 - 50.9 % Final   04/20/2017 1049 29.1 (L) 38.9 - 50.9 % Final     Comment:     Corrected result. Previous result was 29.0 % on 4/20/2017 at 1140 EDT   04/19/2017 1756 22.1 (L) 38.9 - 50.9 % Final            Platlets No results found for: LABPLAT  Sodium  Sodium   Date/Time Value Ref Range Status   04/20/2017 1049 137 132 - 146 mmol/L Final   04/19/2017 1756 140 132 - 146 mmol/L Final     Potassium  Potassium   Date/Time Value Ref Range Status   04/20/2017 1049 4.2 3.5 - 5.5 mmol/L Final   04/19/2017 1756 4.2 3.5 - 5.5 mmol/L Final     Chloride  Chloride   Date/Time Value Ref Range Status   04/20/2017 1049 110 (H) 99 - 109 mmol/L Final   04/19/2017 1756 111 (H) 99 - 109 mmol/L Final     BicarbonateNo results found for: PLASMABICARB    BUN BUN   Date/Time Value Ref Range Status   04/20/2017 1049 22 9 - 23 mg/dL Final   04/19/2017 1756 22 9 - 23 mg/dL Final      Creatinine Creatinine   Date/Time Value Ref Range Status   04/20/2017 1049 1.40 (H) 0.60 - 1.30 mg/dL Final   04/19/2017 1756 1.60 (H) 0.60 - 1.30 mg/dL Final      Calcium Calcium   Date/Time Value Ref Range Status   04/20/2017 1049 8.8 8.7 - 10.4 mg/dL Final   04/19/2017 1756 9.4 8.7 - 10.4 mg/dL Final      Mag No results found for: MG        PT/INR:    Protime   Date Value Ref Range Status   04/20/2017 12.8 (H) 9.6 - 11.5 Seconds Final   /  INR   Date Value Ref Range Status   04/20/2017 1.17  Final     Troponin I   Lab Results   Component Value Date    TROPONINI 0.017 04/19/2017         ipratropium-albuterol 3 mL Nebulization 4x Daily - RT   isosorbide mononitrate 30 mg Oral Q24H   nicotine 1 patch Transdermal Q24H       lactated ringers 9 mL/hr Last Rate: 9 mL/hr (04/20/17 4081)       Assessment/Plan     Patient Active Problem List   Diagnosis Code   • Myelodysplasia, low grade D46.20   • CMML (chronic myelomonocytic leukemia) C93.10   • Thrombocytopenia D69.6   • Hypoxia   R09.02   • Chronic fatigue R53.82   • Unintended weight loss R63.4   • Tobacco abuse Z72.0   • FTT (failure to thrive) in adult R62.7   • Anemia D64.9   • Pericardial effusion with cardiac tamponade I31.3, I31.4     CV stable post pericardial window      FERNANDO Ghotra  04/21/17  9:16 AM

## 2017-04-21 NOTE — PROGRESS NOTES
Acute Care - Occupational Therapy Treatment Note  [facility]     Patient Name: Jerod Zelaya  : 1940  MRN: [id number]  Today's Date: 2017  Onset of Illness/Injury or Date of Surgery Date: 17  Date of Referral to OT: 17  Referring Physician: Mayne, PA      Admit Date: 2017    Visit Dx:     ICD-10-CM ICD-9-CM   1. Impaired functional mobility, balance, gait, and endurance Z74.09 V49.89   2. Impaired mobility and ADLs Z74.09 799.89     Patient Active Problem List   Diagnosis   • Myelodysplasia, low grade   • CMML (chronic myelomonocytic leukemia)   • Thrombocytopenia   • Hypoxia    • Chronic fatigue   • Unintended weight loss   • Tobacco abuse   • FTT (failure to thrive) in adult   • Anemia   • Pericardial effusion with cardiac tamponade             Adult Rehabilitation Note       17 1531          Rehab Assessment/Intervention    Discipline occupational therapist  -AN      Document Type therapy note (daily note)  -AN      Subjective Information no complaints;agree to therapy   pt lying in bed  -AN      Patient Effort, Rehab Treatment good  -AN      Symptoms Noted During/After Treatment fatigue;shortness of breath  -AN      Precautions/Limitations fall precautions;other (see comments)  -AN      Recorded by [AN] Karina Bagley OT      Pain Assessment    Pain Assessment No/denies pain  -AN      Recorded by [AN] Karina Bagley OT      Cognitive Assessment/Intervention    Current Cognitive/Communication Assessment functional  -AN      Orientation Status oriented x 4  -AN      Follows Commands/Answers Questions 100% of the time  -AN      Personal Safety WNL/WFL  -AN      Recorded by [AN] Karina Bagley OT      Bed Mobility, Assessment/Treatment    Bed Mob, Supine to Sit, [place] independent  -AN      Bed Mob, Sit to Supine, [place] independent  -AN      Recorded by [MOSES] Karina Bagley OT      Transfer Assessment/Treatment    Transfers, Sit-Stand [place] stand by assist   -AN      Transfers, Stand-Sit [place] stand by assist  -AN      Recorded by [MOSES] Karina Bagley OT      Therapy Exercises    Bilateral Lower Extremities AROM:;10 reps;sitting;hip abduction/adduction;hip flexion;knee flexion  -AN      Bilateral Upper Extremity AROM:;10 reps;15 reps;supine  -AN      Recorded by [MOSES] Karina Bagley OT      Positioning and Restraints    Pre-Treatment Position in bed  -AN      Post Treatment Position bed  -AN      In Bed supine;call light within reach;encouraged to call for assist  -AN      Recorded by [MOSES] Karina Bagley OT        User Key  (r) = Recorded By, (t) = Taken By, (c) = Cosigned By    Initials Name Effective Dates    AN Karina Bagley OT 06/22/15 -                 OT Goals       04/21/17 1551 04/20/17 1152       Transfer Training OT LTG    Transfer Training OT LTG, Date Established  04/20/17  -AR     Transfer Training OT LTG, Time to Achieve  1 wk  -AR     Transfer Training OT LTG, Activity Type  sit to stand/stand to sit;toilet  -AR     Transfer Training OT LTG, [place] Level  supervision required  -AR     Transfer Training OT LTG, Outcome goal ongoing  -AN      Range of Motion OT LTG    Range of Motion Goal OT LTG, Date Established  04/20/17  -AR     Range of Motion Goal OT LTG, Time to Achieve  1 wk  -AR     Range of Motion Goal OT LTG, AROM Measure  Pt will complete BUE AROM 2 sets x 10 reps to support ADL function   -AR     Range of Motion Goal OT LTG, Outcome goal ongoing  -AN      Patient Education OT LTG    Patient Education OT LTG, Date Established  04/20/17  -AR     Patient Education OT LTG, Time to Achieve  1 wk  -AR     Patient Education OT LTG, Education Type  HEP;home safety;energy conservation;work simplification;adaptive breathing  -AR     Patient Education OT LTG, Education Understanding  demonstrates adequately;verbalizes understanding  -AR     Patient Education OT LTG Outcome goal ongoing  -AN      LB Dressing OT LTG    LB Dressing Goal OT LTG,  Date Established  04/20/17  -AR     LB Dressing Goal OT LTG, Time to Achieve  1 wk  -AR     LB Dressing Goal OT LTG, Activity Type  Pt will complete LB dressing taks   -AR     LB Dressing Goal OT LTG, [place] Level  verbal cues required;supervision required  -AR     LB Dressing Goal OT LTG, Outcome goal ongoing  -AN        User Key  (r) = Recorded By, (t) = Taken By, (c) = Cosigned By    Initials Name Provider Type    MOSES Bagley, OT Occupational Therapist    MAGGI Cox, OT Occupational Therapist          Occupational Therapy Education     Title: PT OT SLP Therapies (Active)     Topic: Occupational Therapy (Done)     Point: ADL training (Done)    Description: Instruct learner(s) on proper safety adaptation and remediation techniques during self care or transfers.   Instruct in proper use of assistive devices.    Learning Progress Summary    Learner Readiness Method Response Comment Documented by Status   Patient Eager AUGUST ERVIN,FLAVIO VU,NR Reviewd EC/WS, incorporation of PLB into ADL routine, role of OT, goals of care AR 04/20/17 1151 Done   Family Eager AUGUST ERVIN,FLAVIO VU,NR Reviewd EC/WS, incorporation of PLB into ADL routine, role of OT, goals of care AR 04/20/17 1151 Done               Point: Home exercise program (Done)    Description: Instruct learner(s) on appropriate technique for monitoring, assisting and/or progressing therapeutic exercises/activities.    Learning Progress Summary    Learner Readiness Method Response Comment Documented by Status   Patient Acceptance AUGUST ERVIN DU Educated pt on UE therapeutic ex and benefits of ex and ADL's. AN 04/21/17 1551 Done               Point: Precautions (Done)    Description: Instruct learner(s) on prescribed precautions during self-care and functional transfers.    Learning Progress Summary    Learner Readiness Method Response Comment Documented by Status   Patient Eager EAUGUST,TB VU,NR Reviewd EC/WS, incorporation of PLB into ADL routine, role of OT, goals of care  AR 04/20/17 1151 Done   Family Divyaer ED,FLAVIO RODRIGUEZ,NR Reviewd EC/WS, incorporation of PLB into ADL routine, role of OT, goals of care AR 04/20/17 1151 Done               Point: Body mechanics (Done)    Description: Instruct learner(s) on proper positioning and spine alignment during self-care, functional mobility activities and/or exercises.    Learning Progress Summary    Learner Readiness Method Response Comment Documented by Status   Patient AUGUST Good,FLAVIO RODRIGUEZ,NR Reviewd EC/WS, incorporation of PLB into ADL routine, role of OT, goals of care AR 04/20/17 1151 Done   Family Divyaer E,D,TB JENNIFER,NR Reviewd EC/WS, incorporation of PLB into ADL routine, role of OT, goals of care AR 04/20/17 1151 Done                      User Key     Initials Effective Dates Name Provider Type Discipline    AN 06/22/15 -  Karina Bagley, OT Occupational Therapist OT    AR 06/22/15 -  Varsha Cox, OT Occupational Therapist OT                  OT Recommendation and Plan  Anticipated Equipment Needs At Discharge: raised toilet seat, tub bench  Anticipated Discharge Disposition: home with assist  Therapy Frequency: daily (per priority policy)  Plan of Care Review  Plan Of Care Reviewed With: patient  Progress: improving  Outcome Summary/Follow up Plan: Pt tolerated 1 set of UE/LE ex's this date with fatigue and SOA end of session. Continue OT To address functional I and endurance.         Outcome Measures       04/21/17 1531 04/20/17 1114 04/20/17 0113    How much help from another person do you currently need...    Turning from your back to your side while in flat bed without using bedrails?  4  -MJ     Moving from lying on back to sitting on the side of a flat bed without bedrails?  4  -MJ     Moving to and from a bed to a chair (including a wheelchair)?  3  -MJ     Standing up from a chair using your arms (e.g., wheelchair, bedside chair)?  3  -MJ     Climbing 3-5 steps with a railing?  3  -MJ     To walk in hospital room?  3  -MJ     AM-PAC  6 Clicks Score  20  -MJ     How much help from another is currently needed...    Putting on and taking off regular lower body clothing? 3  -AN  3  -AR    Bathing (including washing, rinsing, and drying) 3  -AN  3  -AR    Toileting (which includes using toilet bed pan or urinal) 3  -AN  3  -AR    Putting on and taking off regular upper body clothing 3  -AN  3  -AR    Taking care of personal grooming (such as brushing teeth) 3  -AN  3  -AR    Eating meals 4  -AN  4  -AR    Score 19  -AN  19  -AR    Functional Assessment    Outcome Measure Options  AM-PAC 6 Clicks Basic Mobility (PT)  -MJ AM-PAC 6 Clicks Daily Activity (OT)  -AR      User Key  (r) = Recorded By, (t) = Taken By, (c) = Cosigned By    Initials Name Provider Type    MOSES Bagley OT Occupational Therapist    MAGGI Cox, OT Occupational Therapist    WANG Smith, PT Physical Therapist           Time Calculation:         Time Calculation- OT       04/21/17 1553          Time Calculation- OT    OT Start Time 1531  -AN      Total Timed Code Minutes- OT 15 minute(s)  -AN      OT Received On 04/21/17  -AN      OT Goal Re-Cert Due Date 04/30/17  -AN        User Key  (r) = Recorded By, (t) = Taken By, (c) = Cosigned By    Initials Name Provider Type    MOSES Bagley OT Occupational Therapist           Therapy Charges for Today     Code Description Service Date Service Provider Modifiers Qty    [id number]  OT THERAPEUTIC ACT EA 15 MIN 4/21/2017 Karina Bagley OT GO 1               Karina Bagley OT  4/21/2017

## 2017-04-21 NOTE — PROGRESS NOTES
[facility] Medicine Services  INPATIENT PROGRESS NOTE    Date of Admission: 4/19/2017  Length of Stay: 2  Primary Care Physician: Angel Underwood MD    Subjective   CC: f/u anemia   HPI:  Patient resting in bed when seen.  Pericardial drain in place, with 35 mL of serosanguineous fluid noted.  He denies significant chest pain, dyspnea, or change to chronic cough.  Overall, notes that his fatigue and exertional tolerance has improved, even since yesterday.  No fevers or chills.    Review Of Systems:   Review of Systems   Constitutional: Negative for chills and fever.   Respiratory: Positive for cough. Negative for shortness of breath.    Cardiovascular: Negative for chest pain and palpitations.   Gastrointestinal: Negative for abdominal pain, nausea and vomiting.     Objective      Temp:  [97.4 °F (36.3 °C)-99 °F (37.2 °C)] 97.4 °F (36.3 °C)  Heart Rate:  [73-90] 90  Resp:  [14-20] 20  BP: (105-140)/(63-84) 127/66  Physical Exam  Constitutional: no acute distress, awake, alert, chronically ill appearing   Respiratory: Diminished bilaterally but no wheezing, nonlabored respirations   Cardiovascular: RRR, pericardial drain in place, no murmurs, rubs, or gallops, palpable pedal pulses bilaterally  Gastrointestinal: Positive bowel sounds, soft, nontender, nondistended  Musculoskeletal: No bilateral ankle edema  Psychiatric: oriented x 3, appropriate affect, cooperative  Neurologic: Strength symmetric in all extremities     Results Review:    I have reviewed the labs, radiology results and diagnostic studies.      Results from last 7 days  Lab Units 04/21/17  0821   WBC 10*3/mm3 28.46*   HEMOGLOBIN g/dL 9.7*   PLATELETS 10*3/mm3 162       Results from last 7 days  Lab Units 04/21/17  0821   SODIUM mmol/L 137   POTASSIUM mmol/L 4.0   CHLORIDE mmol/L 109   TOTAL CO2 mmol/L 24.0   BUN mg/dL 17   CREATININE mg/dL 1.40*   GLUCOSE mg/dL 91   CALCIUM mg/dL 8.9     Repeat labs this AM pending.      Culture Data: Cultures: n/a     Radiology Data:   Repeat CXR this AM reviewed, agree with official interp. No PTX or abnormalities     Echo   Interpretation Summary      · Left ventricular function is normal. Estimated EF = 60%.  · Left atrial cavity size is mildly dilated.  · Moderate tricuspid valve regurgitation is present.  · Mild pulmonic valve regurgitation is present.  · Moderate aortic valve regurgitation is present.  · large circumferential pericardial effusion with tamponade physiology with significant RV RA diastolic collapse but also left cardiac chambers collapse         I have reviewed the medications.    Assessment/Plan   Patient is a 76-year-old male with a past history of MDS, CMML, CKD III (past Cr 1.8-1.9) chronic thrombocytopenia, melanoma, CAD with history of CABG, and nephrolithiasis who is admitted as a direct request of Dr. Alves for evaluation of acute hypoxia with room air oxygen saturation of 77% while attending an outpatient appointment (Room air sats normal on arrival here). Subsequent evaluation disclosed acute anemia and large pericardial effusion with diastolic collapse of the LV and RV concerning for tamponade:     Problem List  Hospital Problem List     * (Principal)Hypoxia     Anemia    Pericardial effusion with cardiac tamponade    Myelodysplasia, low grade    CMML (chronic myelomonocytic leukemia)    Overview Signed 10/4/2016  1:08 PM by Apple Velez                Thrombocytopenia    Overview Signed 10/4/2016  1:08 PM by Apple Velez              Chronic fatigue    Unintended weight loss    Tobacco abuse    FTT (failure to thrive) in adult        Assessment/Plan:  - Pt is s/p 2 units PRBCs with repeat Hgb appropriate and stable, no signs of active GI Blood loss  - Remains without hypoxia at rest   - CT Chest with chronic lung disease and only trivial LLL airspace disease, no dominant mass    - His exertional dyspnea and fatigue is improved and likely due to anemia  with concern for worsening MDS/leukemia and his pericardial effusion however the timing of his pericardial effusion remains unclear ( I don't see an effusion on CT Chest, d/w Dr. Bass 4/20. )  - F/u results of BM bx   - Pt is s/p pericardiocentesis with drain placement on 4/20 due to echo with surprising finding of large pericardial effusion with tamponade physiology.   - Indu Pop and Shelia following  - Holding aspirin temporarily   - Continue PT/OT  - Disposition depends on management of pericardial effusion     DVT prophylaxis: mechanical only  Discharge Planning: I expect patient to be discharged to home in to be determined days.    Aman Eckert MD   04/21/17   1:33 PM    Please note that portions of this note may have been completed with a voice recognition program. Efforts were made to edit the dictations, but occasionally words are mistranscribed.

## 2017-04-22 NOTE — PROGRESS NOTES
Acute Care - Physical Therapy Treatment Note  [facility]     Patient Name: Jerod Zelaya  : 1940  MRN: [id number]  Today's Date: 2017  Onset of Illness/Injury or Date of Surgery Date: 17  Date of Referral to PT: 17  Referring Physician: Mayne, PA    Admit Date: 2017    Visit Dx:    ICD-10-CM ICD-9-CM   1. Impaired functional mobility, balance, gait, and endurance Z74.09 V49.89   2. Impaired mobility and ADLs Z74.09 799.89     Patient Active Problem List   Diagnosis   • Myelodysplasia, low grade   • CMML (chronic myelomonocytic leukemia)   • Thrombocytopenia   • Hypoxia    • Chronic fatigue   • Unintended weight loss   • Tobacco abuse   • FTT (failure to thrive) in adult   • Anemia   • Pericardial effusion with cardiac tamponade               Adult Rehabilitation Note       17 1642 17 1531       Rehab Assessment/Intervention    Discipline physical therapist  -LS occupational therapist  -AN     Document Type therapy note (daily note)  -LS therapy note (daily note)  -AN     Subjective Information agree to therapy  -LS no complaints;agree to therapy   pt lying in bed  -AN     Patient Effort, Rehab Treatment  good  -AN     Symptoms Noted During/After Treatment  fatigue;shortness of breath  -AN     Precautions/Limitations fall precautions   chest tube  -LS fall precautions;other (see comments)  -AN     Recorded by [LS] Stephani Morris, PT [AN] Karina Bagley, OT     Pain Assessment    Pain Assessment 0-10  -LS No/denies pain  -AN     Recorded by [LS] Stephani Morris, PT [AN] Karina Bagley, OT     Cognitive Assessment/Intervention    Current Cognitive/Communication Assessment  functional  -AN     Orientation Status  oriented x 4  -AN     Follows Commands/Answers Questions  100% of the time  -AN     Personal Safety  WNL/WFL  -AN     Recorded by  [AN] Karina Bagley, OT     Bed Mobility, Assessment/Treatment    Bed Mob, Supine to Sit, [place] independent  -LS  independent  -AN     Bed Mob, Sit to Supine, [place] independent  -LS independent  -AN     Recorded by [LS] Stephani Morris, ADIA [AN] Karina Bagley OT     Transfer Assessment/Treatment    Transfers, Sit-Stand [place] stand by assist   1 LOB upon initial standing causing pt to sit back on bed  -LS stand by assist  -AN     Transfers, Stand-Sit [place] stand by assist  -LS stand by assist  -AN     Recorded by [LS] Stephani Morris PT [AN] Karina Bagley OT     Gait Assessment/Treatment    Gait, [place] Level minimum assist (75% patient effort)   CGA to min A  -LS      Gait, Distance (Feet) 400  -LS      Gait, Gait Deviations forward flexed posture  -LS      Gait, Comment Pt amb with CGA to occasional min A for balance  -LS      Recorded by [LS] Stephani Morris PT      Balance Skills Training    Standing-Level of Assistance Minimum assistance   CGA to min A  -LS      Standing-Balance Activities Weight Shift A-P;Weight Shift R-L  -LS      Recorded by [LS] Stephani Morris PT      Therapy Exercises    Bilateral Lower Extremities AROM:;20 reps;supine;ankle pumps/circles;SLR  -LS AROM:;10 reps;sitting;hip abduction/adduction;hip flexion;knee flexion  -AN     Bilateral Upper Extremity AROM:;20 reps;supine;elbow flexion/extension;shoulder abduction/adduction;shoulder extension/flexion  -LS AROM:;10 reps;15 reps;supine  -AN     Recorded by [LS] Stephani Morris, AIDA [AN] Karina Bagley OT     Positioning and Restraints    Pre-Treatment Position in bed  -LS in bed  -AN     Post Treatment Position bed  -LS bed  -AN     In Bed notified nsg;supine;call light within reach   multiple visitors  -LS supine;call light within reach;encouraged to call for assist  -AN     Recorded by [LS] Stephani Morris, ADIA [AN] Karina Bagley OT       User Key  (r) = Recorded By, (t) = Taken By, (c) = Cosigned By    Initials Name Effective Dates    AN Karina Bagley OT 06/22/15 -      Stephani Morris PT  06/19/15 -                 IP PT Goals       04/22/17 1705 04/20/17 1147       Bed Mobility PT LTG    Bed Mobility PT LTG, Date Established  04/20/17  -MJ     Bed Mobility PT LTG, Time to Achieve  5 days  -MJ     Bed Mobility PT LTG, Activity Type  supine to sit/sit to supine  -MJ     Bed Mobility PT LTG, [place] Level  independent  -MJ     Bed Mobility PT LTG, Date Goal Reviewed  04/20/17  -MJ     Bed Mobility PT LTG, Outcome  goal met  -MJ     Transfer Training PT LTG    Transfer Training PT LTG, Date Established  04/20/17  -MJ     Transfer Training PT LTG, Time to Achieve  5 days  -MJ     Transfer Training PT LTG, Activity Type  sit to stand/stand to sit  -MJ     Transfer Training PT LTG, [place] Level  independent  -MJ     Transfer Training PT LTG, Outcome goal ongoing  -LS      Gait Training PT LTG    Gait Training Goal PT LTG, Date Established  04/20/17  -MJ     Gait Training Goal PT LTG, Time to Achieve  1 wk  -MJ     Gait Training Goal PT LTG, [place] Level  independent  -MJ     Gait Training Goal PT LTG, Distance to Achieve  350 feet  -MJ     Gait Training Goal PT LTG, Outcome goal ongoing  -LS        User Key  (r) = Recorded By, (t) = Taken By, (c) = Cosigned By    Initials Name Provider Type     Stephani Morris, PT Physical Therapist    WANG Smith, PT Physical Therapist          Physical Therapy Education     Title: PT OT SLP Therapies (Done)     Topic: Physical Therapy (Done)     Point: Mobility training (Done)    Learning Progress Summary    Learner Readiness Method Response Comment Documented by Status   Patient Acceptance AUGUST ERVIN NR   04/20/17 1147 Done   Significant Other Acceptance AUGUST ERVIN NR   04/20/17 1147 Done               Point: Home exercise program (Done)    Learning Progress Summary    Learner Readiness Method Response Comment Documented by Status   Patient Acceptance ARCADIO RODRIGUEZNR Discussed benefits of activity.  04/22/17 1705 Done               Point: Body mechanics  (Done)    Learning Progress Summary    Learner Readiness Method Response Comment Documented by Status   Patient Acceptance EAUGUSTNR  MJ 04/20/17 1147 Done   Significant Other Acceptance EAUGUSTNR   04/20/17 1147 Done               Point: Precautions (Done)    Learning Progress Summary    Learner Readiness Method Response Comment Documented by Status   Patient Acceptance AUGUST ERVINNR   04/20/17 1147 Done   Significant Other Acceptance EAUGUSTNR   04/20/17 1147 Done                      User Key     Initials Effective Dates Name Provider Type Discipline     06/19/15 -  Stephani Morris, PT Physical Therapist PT     03/14/16 -  Andrea Smith, PT Physical Therapist PT                    PT Recommendation and Plan  Anticipated Discharge Disposition: home with assist  Planned Therapy Interventions: balance training, gait training, home exercise program, patient/family education, strengthening, transfer training  PT Frequency: daily  Plan of Care Review  Plan Of Care Reviewed With: patient  Outcome Summary/Follow up Plan: Pt able to increase walking distance but needed CGA to min A for balance. Recommend cont. P.T. and work on balance and gait activities.          Outcome Measures       04/22/17 1642 04/21/17 1531 04/20/17 1114    How much help from another person do you currently need...    Turning from your back to your side while in flat bed without using bedrails? 4  -LS  4  -MJ    Moving from lying on back to sitting on the side of a flat bed without bedrails? 4  -LS  4  -MJ    Moving to and from a bed to a chair (including a wheelchair)? 3  -LS  3  -MJ    Standing up from a chair using your arms (e.g., wheelchair, bedside chair)? 3  -LS  3  -MJ    Climbing 3-5 steps with a railing? 3  -LS  3  -MJ    To walk in hospital room? 3  -LS  3  -MJ    AM-PAC 6 Clicks Score 20  -LS  20  -MJ    How much help from another is currently needed...    Putting on and taking off regular lower body clothing?  3  -AN      Bathing (including washing, rinsing, and drying)  3  -AN     Toileting (which includes using toilet bed pan or urinal)  3  -AN     Putting on and taking off regular upper body clothing  3  -AN     Taking care of personal grooming (such as brushing teeth)  3  -AN     Eating meals  4  -AN     Score  19  -AN     Functional Assessment    Outcome Measure Options AM-PAC 6 Clicks Basic Mobility (PT)  -  AM-PAC 6 Clicks Basic Mobility (PT)  -      04/20/17 0113          How much help from another is currently needed...    Putting on and taking off regular lower body clothing? 3  -AR      Bathing (including washing, rinsing, and drying) 3  -AR      Toileting (which includes using toilet bed pan or urinal) 3  -AR      Putting on and taking off regular upper body clothing 3  -AR      Taking care of personal grooming (such as brushing teeth) 3  -AR      Eating meals 4  -AR      Score 19  -AR      Functional Assessment    Outcome Measure Options AM-PAC 6 Clicks Daily Activity (OT)  -AR        User Key  (r) = Recorded By, (t) = Taken By, (c) = Cosigned By    Initials Name Provider Type    AN Karina Bagley, OT Occupational Therapist    AR Varsha Cox, OT Occupational Therapist    LS Stephani Morris, PT Physical Therapist    WANG Smith, PT Physical Therapist           Time Calculation:         PT Charges       04/22/17 1704          Time Calculation    Start Time 1642   tcc: 15min  -LS      PT Received On 04/22/17  -      PT Goal Re-Cert Due Date 04/30/17  -        User Key  (r) = Recorded By, (t) = Taken By, (c) = Cosigned By    Initials Name Provider Type     Stephani Morris, PT Physical Therapist          Therapy Charges for Today     Code Description Service Date Service Provider Modifiers Qty    37678204705 HC PT THER PROC EA 15 MIN 4/22/2017 Stephani Morris, PT GP 1          PT G-Codes  Outcome Measure Options: AM-PAC 6 Clicks Basic Mobility (PT)    Stephani Morris, PT  4/22/2017

## 2017-04-22 NOTE — PLAN OF CARE
Problem: Activity Intolerance (Adult)  Goal: Effective Energy Conservation Techniques  Outcome: Ongoing (interventions implemented as appropriate)

## 2017-04-22 NOTE — PLAN OF CARE
Problem: Patient Care Overview (Adult)  Goal: Plan of Care Review  Outcome: Ongoing (interventions implemented as appropriate)    04/22/17 0248   Coping/Psychosocial Response Interventions   Plan Of Care Reviewed With patient   Patient Care Overview   Progress progress toward functional goals as expected         Problem: Activity Intolerance (Adult)  Goal: Activity Tolerance  Outcome: Ongoing (interventions implemented as appropriate)    04/22/17 0248   Activity Intolerance (Adult)   Activity Tolerance making progress toward outcome       Goal: Effective Energy Conservation Techniques  Outcome: Ongoing (interventions implemented as appropriate)    04/22/17 0248   Activity Intolerance (Adult)   Effective Energy Conservation Techniques making progress toward outcome         Problem: Skin Integrity Impairment, Risk/Actual (Adult)  Goal: Skin Integrity/Wound Healing  Outcome: Ongoing (interventions implemented as appropriate)    04/22/17 0248   Skin Integrity Impairment, Risk/Actual (Adult)   Skin Integrity/Wound Healing making progress toward outcome         Problem: Pressure Ulcer (Adult)  Goal: Signs and Symptoms of Listed Potential Problems Will be Absent or Manageable (Pressure Ulcer)  Outcome: Ongoing (interventions implemented as appropriate)    04/22/17 0248   Pressure Ulcer   Problems Assessed (Pressure Ulcer) all   Problems Present (Pressure Ulcer) none

## 2017-04-22 NOTE — PROGRESS NOTES
[facility] Medicine Services  INPATIENT PROGRESS NOTE    Date of Admission: 4/19/2017  Length of Stay: 3  Primary Care Physician: Angel Underwood MD    Subjective   CC: f/u anemia   HPI:  Patient doing well. Continues to have output from pericardial drain (75mL over 24 hours). Denies significant chest pain. Fatigue improved since admission. Denies dyspnea. Cough stable.     Review Of Systems:   Review of Systems   Constitutional: Negative for chills and fever.   Respiratory: Positive for cough. Negative for shortness of breath.    Cardiovascular: Negative for chest pain and palpitations.   Gastrointestinal: Negative for abdominal pain, nausea and vomiting.     Objective      Temp:  [97.5 °F (36.4 °C)-97.8 °F (36.6 °C)] 97.5 °F (36.4 °C)  Heart Rate:  [81-90] 83  Resp:  [16-20] 16  BP: (108-115)/(69-70) 115/70  Physical Exam  Constitutional: no acute distress, awake, alert, chronically ill appearing   Respiratory: Diminished bilaterally but clear, nonlabored respirations   Cardiovascular: RRR, pericardial drain in place with serosanguinous fluid in pleurevac, no murmurs, rubs, or gallops, palpable pedal pulses bilaterally  Gastrointestinal: Positive bowel sounds, soft, nontender, nondistended  Musculoskeletal: No bilateral ankle edema  Psychiatric: oriented x 3, appropriate affect, cooperative  Neurologic: Strength symmetric in all extremities     Results Review:    I have reviewed the labs, radiology results and diagnostic studies.      Results from last 7 days  Lab Units 04/22/17  0734   WBC 10*3/mm3 25.06*   HEMOGLOBIN g/dL 9.6*   PLATELETS 10*3/mm3 167       Results from last 7 days  Lab Units 04/22/17  0734   SODIUM mmol/L 137   POTASSIUM mmol/L 3.9   CHLORIDE mmol/L 109   TOTAL CO2 mmol/L 23.0   BUN mg/dL 17   CREATININE mg/dL 1.20   GLUCOSE mg/dL 98   CALCIUM mg/dL 8.9       Culture Data: Cultures: n/a     Radiology Data: No new    Echo   Interpretation Summary      · Left ventricular  function is normal. Estimated EF = 60%.  · Left atrial cavity size is mildly dilated.  · Moderate tricuspid valve regurgitation is present.  · Mild pulmonic valve regurgitation is present.  · Moderate aortic valve regurgitation is present.  · large circumferential pericardial effusion with tamponade physiology with significant RV RA diastolic collapse but also left cardiac chambers collapse         I have reviewed the medications.    Assessment/Plan   Patient is a 76-year-old male with a past history of MDS, CMML, CKD III (past Cr 1.8-1.9) chronic thrombocytopenia, melanoma, CAD with history of CABG, and nephrolithiasis who is admitted as a direct request of Dr. Alves for evaluation of acute hypoxia with room air oxygen saturation of 77% while attending an outpatient appointment (Room air sats normal on arrival here). Subsequent evaluation disclosed acute anemia and large pericardial effusion with diastolic collapse of the LV and RV concerning for tamponade:     Problem List  Hospital Problem List     * (Principal)Hypoxia     Anemia    Pericardial effusion with cardiac tamponade    Myelodysplasia, low grade    CMML (chronic myelomonocytic leukemia)    Overview Signed 10/4/2016  1:08 PM by Apple Velez                Thrombocytopenia    Overview Signed 10/4/2016  1:08 PM by Apple Velez              Chronic fatigue    Unintended weight loss    Tobacco abuse    FTT (failure to thrive) in adult        Assessment/Plan:  - Pt is s/p 2 units PRBCs with repeat Hgb appropriate and stable, no signs of active GI Blood loss  - CT Chest with chronic lung disease and only trivial LLL airspace disease, no dominant mass    - His exertional dyspnea and fatigue is improved and likely due to anemia with concern for worsening MDS/leukemia and his pericardial effusion however the timing of his pericardial effusion remains unclear ( I don't see an effusion on CT Chest, d/w Dr. Bass 4/20. )  - F/u results of BM bx   - Pt is  s/p pericardiocentesis with drain placement on 4/20 due to echo with surprising finding of large pericardial effusion with tamponade physiology. Output continues, defer to CTS on decision regarding need for pericardial window  - Indu Pop and Shelia following  - Holding aspirin temporarily   - Continue PT/OT  - Disposition depends on management of pericardial effusion     DVT prophylaxis: mechanical only  Discharge Planning: I expect patient to be discharged to home in to be determined days.    Aman Eckert MD   04/22/17   1:23 PM    Please note that portions of this note may have been completed with a voice recognition program. Efforts were made to edit the dictations, but occasionally words are mistranscribed.

## 2017-04-22 NOTE — PROGRESS NOTES
[facility]       LOS: 3 days   Patient Care Team:  Angel Underwood MD as PCP - General (Family Medicine)  Deniz Alves MD as PCP - Claims Attributed - PLEASE DO NOT REMOVE        Subjective       Patient Denies:  Cp, sob, palps.  Feel good      Vital Signs  Temp:  [97.5 °F (36.4 °C)-97.8 °F (36.6 °C)] 97.5 °F (36.4 °C)  Heart Rate:  [73-90] 84  Resp:  [16-20] 16  BP: (108-127)/(66-70) 115/70    Intake/Output Summary (Last 24 hours) at 04/22/17 1111  Last data filed at 04/22/17 0800   Gross per 24 hour   Intake              360 ml   Output              677 ml   Net             -317 ml     I/O this shift:  In: -   Out: 2 [Other:2]    Physical Exam:     General Appearance:    Alert, cooperative, in no acute distress       Neck:   No adenopathy, supple, trachea midline, no JVD       Lungs:     Decreased at bases, respirations regular, even and                  unlabored    Heart:    Regular rhythm and normal rate, normal S1 and S2, no            murmur, no gallop, no rub, no click   Chest Wall:    No abnormalities observed   Abdomen:     Normal bowel sounds, no masses, no organomegaly, soft               Extremities:   Moves all extremities well, no edema, no cyanosis, no             redness   Pulses:   Pulses palpable and equal bilaterally     Results Review:     I reviewed the patient's new clinical results.      WBC WBC   Date/Time Value Ref Range Status   04/21/2017 0821 28.46 (H) 3.50 - 10.80 10*3/mm3 Preliminary   04/20/2017 1049 27.55 (H) 3.50 - 10.80 10*3/mm3 Final     Comment:     Corrected result. Previous result was 27.82 10*3/mm3 on 4/20/2017 at 1140 EDT   04/19/2017 1756 27.31 (H) 3.50 - 10.80 10*3/mm3 Final            HGB Hemoglobin   Date/Time Value Ref Range Status   04/21/2017 0821 9.7 (L) 13.1 - 17.5 g/dL Preliminary   04/20/2017 1818 8.5 (L) 13.1 - 17.5 g/dL Final   04/20/2017 1049 8.9 (L) 13.1 - 17.5 g/dL Final     Comment:      Corrected result. Previous result was 8.8 g/dL on 4/20/2017 at 1140 EDT   04/19/2017 1756 6.4 (L) 13.1 - 17.5 g/dL Final           HCT Hematocrit   Date/Time Value Ref Range Status   04/21/2017 0821 32.9 (L) 38.9 - 50.9 % Preliminary   04/20/2017 1818 28.2 (L) 38.9 - 50.9 % Final   04/20/2017 1049 29.1 (L) 38.9 - 50.9 % Final     Comment:     Corrected result. Previous result was 29.0 % on 4/20/2017 at 1140 EDT   04/19/2017 1756 22.1 (L) 38.9 - 50.9 % Final            Platlets No results found for: LABPLAT  Sodium  Sodium   Date/Time Value Ref Range Status   04/22/2017 0734 137 132 - 146 mmol/L Final   04/21/2017 0821 137 132 - 146 mmol/L Final   04/20/2017 1049 137 132 - 146 mmol/L Final   04/19/2017 1756 140 132 - 146 mmol/L Final     Potassium  Potassium   Date/Time Value Ref Range Status   04/22/2017 0734 3.9 3.5 - 5.5 mmol/L Final   04/21/2017 0821 4.0 3.5 - 5.5 mmol/L Final   04/20/2017 1049 4.2 3.5 - 5.5 mmol/L Final   04/19/2017 1756 4.2 3.5 - 5.5 mmol/L Final     Chloride  Chloride   Date/Time Value Ref Range Status   04/22/2017 0734 109 99 - 109 mmol/L Final   04/21/2017 0821 109 99 - 109 mmol/L Final   04/20/2017 1049 110 (H) 99 - 109 mmol/L Final   04/19/2017 1756 111 (H) 99 - 109 mmol/L Final     BicarbonateNo results found for: PLASMABICARB    BUN BUN   Date/Time Value Ref Range Status   04/22/2017 0734 17 9 - 23 mg/dL Final   04/21/2017 0821 17 9 - 23 mg/dL Final   04/20/2017 1049 22 9 - 23 mg/dL Final   04/19/2017 1756 22 9 - 23 mg/dL Final      Creatinine Creatinine   Date/Time Value Ref Range Status   04/22/2017 0734 1.20 0.60 - 1.30 mg/dL Final   04/21/2017 0821 1.40 (H) 0.60 - 1.30 mg/dL Final   04/20/2017 1049 1.40 (H) 0.60 - 1.30 mg/dL Final   04/19/2017 1756 1.60 (H) 0.60 - 1.30 mg/dL Final      Calcium Calcium   Date/Time Value Ref Range Status   04/22/2017 0734 8.9 8.7 - 10.4 mg/dL Final   04/21/2017 0821 8.9 8.7 - 10.4 mg/dL Final   04/20/2017 1049 8.8 8.7 - 10.4 mg/dL Final   04/19/2017  1756 9.4 8.7 - 10.4 mg/dL Final      Mag No results found for: MG        PT/INR:    Protime   Date Value Ref Range Status   04/20/2017 12.8 (H) 9.6 - 11.5 Seconds Final   /  INR   Date Value Ref Range Status   04/20/2017 1.17  Final     Troponin I     Lab Results   Component Value Date    TROPONINI 0.017 04/19/2017         ipratropium-albuterol 3 mL Nebulization 4x Daily - RT   isosorbide mononitrate 30 mg Oral Q24H   nicotine 1 patch Transdermal Q24H       lactated ringers 9 mL/hr Last Rate: 9 mL/hr (04/20/17 1804)       Assessment/Plan     Patient Active Problem List   Diagnosis Code   • Myelodysplasia, low grade D46.20   • CMML (chronic myelomonocytic leukemia) C93.10   • Thrombocytopenia D69.6   • Hypoxia  R09.02   • Chronic fatigue R53.82   • Unintended weight loss R63.4   • Tobacco abuse Z72.0   • FTT (failure to thrive) in adult R62.7   • Anemia D64.9   • Pericardial effusion with cardiac tamponade I31.3, I31.4     CV stable post pericardial window      FERNANDO Ghotra  04/22/17  11:11 AM

## 2017-04-22 NOTE — PLAN OF CARE
Problem: Patient Care Overview (Adult)  Goal: Plan of Care Review  Outcome: Ongoing (interventions implemented as appropriate)    Problem: Inpatient Physical Therapy  Goal: Transfer Training Goal 1 LTG- PT  Outcome: Ongoing (interventions implemented as appropriate)    04/20/17 1147 04/22/17 1705   Transfer Training PT LTG   Transfer Training PT LTG, Date Established 04/20/17 --    Transfer Training PT LTG, Time to Achieve 5 days --    Transfer Training PT LTG, Activity Type sit to stand/stand to sit --    Transfer Training PT LTG, [place] Level independent --    Transfer Training PT LTG, Outcome --  goal ongoing       Goal: Gait Training Goal LTG- PT  Outcome: Ongoing (interventions implemented as appropriate)    04/20/17 1147 04/22/17 1705   Gait Training PT LTG   Gait Training Goal PT LTG, Date Established 04/20/17 --    Gait Training Goal PT LTG, Time to Achieve 1 wk --    Gait Training Goal PT LTG, [place] Level independent --    Gait Training Goal PT LTG, Distance to Achieve 350 feet --    Gait Training Goal PT LTG, Outcome --  goal ongoing

## 2017-04-22 NOTE — PROGRESS NOTES
Cardiothoracic Surgery Progress Note      PPD # 1 s/p Pericardial drain       LOS: 3 days      Chief Complaint: Shortness of breath    Subjective:  No complaints.  Feels better today    Objective:  Vital Signs  Temp:  [97.5 °F (36.4 °C)-97.8 °F (36.6 °C)] 97.5 °F (36.4 °C)  Heart Rate:  [81-90] 86  Resp:  [16-20] 16  BP: (108-115)/(69-70) 115/70    Physical Exam:   General Appearance: alert, appears stated age and cooperative   Lungs: clear to auscultation, respirations regular, respirations even and respirations unlabored   Heart: regular rhythm & normal rate, normal S1, S2 and no murmur, no richelle, no rub   Skin: Incision c/d/i     Results:    Results from last 7 days  Lab Units 04/22/17  0734   WBC 10*3/mm3 25.06*   HEMOGLOBIN g/dL 9.6*   HEMATOCRIT % 31.8*   PLATELETS 10*3/mm3 167       Results from last 7 days  Lab Units 04/22/17  0734   SODIUM mmol/L 137   POTASSIUM mmol/L 3.9   CHLORIDE mmol/L 109   TOTAL CO2 mmol/L 23.0   BUN mg/dL 17   CREATININE mg/dL 1.20   GLUCOSE mg/dL 98   CALCIUM mg/dL 8.9         Assessment:  PPD # 1 s/p Pericardial drain, expected recovery    Plan:  Will need repeat echocardiogram to assess effusion.  Suspect loculated collection.  Discussed with Dr. Pop  Continue drain until no drainage for 24 hours      Justin Bass MD  04/22/17  4:30 PM

## 2017-04-23 NOTE — PROGRESS NOTES
"    [facility] Medicine Services  INPATIENT PROGRESS NOTE    Date of Admission: 4/19/2017  Length of Stay: 4  Primary Care Physician: Angel Underwood MD    Subjective   CC: f/u anemia   HPI:  Pt continues to do well. He reports that he feels \"100%\" better than he has in the past few months. Denies fevers, chills, dyspnea, or chest pain. Pericardial fluid output has decreased. There were 22mL charted over the past 24 hours and RN reports that no fluid has drained out since shift change.     Review Of Systems:   Review of Systems   Constitutional: Negative for chills and fever.   Respiratory: Negative for shortness of breath.    Cardiovascular: Negative for chest pain and palpitations.   Gastrointestinal: Negative for nausea and vomiting.     Objective      Temp:  [97.6 °F (36.4 °C)] 97.6 °F (36.4 °C)  Heart Rate:  [82-91] 84  Resp:  [16-20] 16  BP: (125)/(77) 125/77  Physical Exam Stable  Constitutional: no acute distress, awake, alert, chronically ill appearing   Respiratory: Diminished bilaterally but clear, nonlabored respirations   Cardiovascular: RRR, pericardial drain in place with serosanguinous fluid in pleurevac, no murmurs, rubs, or gallops, palpable pedal pulses bilaterally  Gastrointestinal: Positive bowel sounds, soft, nontender, nondistended  Musculoskeletal: No bilateral ankle edema  Psychiatric: oriented x 3, appropriate affect, cooperative  Neurologic: Strength symmetric in all extremities     Results Review:    I have reviewed the labs, radiology results and diagnostic studies.      Results from last 7 days  Lab Units 04/22/17  0734   WBC 10*3/mm3 25.06*   HEMOGLOBIN g/dL 9.6*   PLATELETS 10*3/mm3 167       Results from last 7 days  Lab Units 04/22/17  0734   SODIUM mmol/L 137   POTASSIUM mmol/L 3.9   CHLORIDE mmol/L 109   TOTAL CO2 mmol/L 23.0   BUN mg/dL 17   CREATININE mg/dL 1.20   GLUCOSE mg/dL 98   CALCIUM mg/dL 8.9       Culture Data: Cultures: n/a     Radiology Data: CXR " this am with pericardial drain in place, no focal infiltrates or consolidation. Stable on my personal review and interp from 4/21 study    Echo   Interpretation Summary      · Left ventricular function is normal. Estimated EF = 60%.  · Left atrial cavity size is mildly dilated.  · Moderate tricuspid valve regurgitation is present.  · Mild pulmonic valve regurgitation is present.  · Moderate aortic valve regurgitation is present.  · large circumferential pericardial effusion with tamponade physiology with significant RV RA diastolic collapse but also left cardiac chambers collapse         I have reviewed the medications.    Assessment/Plan   Patient is a 76-year-old male with a past history of MDS, CMML, CKD III (past Cr 1.8-1.9) chronic thrombocytopenia, melanoma, CAD with history of CABG, and nephrolithiasis who is admitted as a direct request of Dr. Alves for evaluation of acute hypoxia with room air oxygen saturation of 77% while attending an outpatient appointment (Room air sats normal on arrival here). Subsequent evaluation disclosed acute anemia and large pericardial effusion with diastolic collapse of the LV and RV concerning for tamponade:     Problem List  Hospital Problem List     * (Principal)Hypoxia     Anemia    Pericardial effusion with cardiac tamponade    Myelodysplasia, low grade    CMML (chronic myelomonocytic leukemia)    Overview Signed 10/4/2016  1:08 PM by Apple Velez                Thrombocytopenia    Overview Signed 10/4/2016  1:08 PM by Apple Velez              Chronic fatigue    Unintended weight loss    Tobacco abuse    FTT (failure to thrive) in adult        Assessment/Plan:  - Pt is s/p 2 units PRBCs with repeat Hgb appropriate and stable, no signs of active GI Blood loss  - Recheck CBC tomorrow AM  - CT Chest with chronic lung disease and only trivial LLL airspace disease, no dominant mass    - His exertional dyspnea and fatigue is improved and likely due to anemia with  concern for worsening MDS/leukemia and his pericardial effusion   - F/u results of BM bx   - Pt is s/p pericardiocentesis with drain placement on 4/20 due to echo with surprising finding of large pericardial effusion with tamponade physiology. Output is declining, drain will stay in place until output stops. Repeat echo ordered for today   - Indu Pop and Shelia following  - Holding aspirin temporarily   - Continue PT/OT  - Disposition depends on management of pericardial effusion     DVT prophylaxis: mechanical only  Discharge Planning: I expect patient to be discharged to home in to be determined days.    Aman Eckert MD   04/23/17   10:30 AM    Please note that portions of this note may have been completed with a voice recognition program. Efforts were made to edit the dictations, but occasionally words are mistranscribed.

## 2017-04-23 NOTE — PROGRESS NOTES
Acute Care - Occupational Therapy Treatment Note  [facility]     Patient Name: Jerod Zelaya  : 1940  MRN: [id number]  Today's Date: 2017  Onset of Illness/Injury or Date of Surgery Date: 17  Date of Referral to OT: 17  Referring Physician: Mayne, PA      Admit Date: 2017    Visit Dx:     ICD-10-CM ICD-9-CM   1. Impaired functional mobility, balance, gait, and endurance Z74.09 V49.89   2. Impaired mobility and ADLs Z74.09 799.89     Patient Active Problem List   Diagnosis   • Myelodysplasia, low grade   • CMML (chronic myelomonocytic leukemia)   • Thrombocytopenia   • Hypoxia    • Chronic fatigue   • Unintended weight loss   • Tobacco abuse   • FTT (failure to thrive) in adult   • Anemia   • Pericardial effusion with cardiac tamponade             Adult Rehabilitation Note       17 1500 17 1642 17 1531    Rehab Assessment/Intervention    Discipline occupational therapist  -AC physical therapist  -LS occupational therapist  -AN    Document Type therapy note (daily note)  -AC therapy note (daily note)  -LS therapy note (daily note)  -AN    Subjective Information agree to therapy  -AC agree to therapy  -LS no complaints;agree to therapy   pt lying in bed  -AN    Patient Effort, Rehab Treatment good  -AC  good  -AN    Symptoms Noted During/After Treatment   fatigue;shortness of breath  -AN    Precautions/Limitations fall precautions   chest tube  -AC fall precautions   chest tube  -LS fall precautions;other (see comments)  -AN    Recorded by [AC] Esmer Peraza, OT [LS] Stephani Morris, PT [AN] Karina Bagley OT    Vital Signs    Intra Systolic BP Rehab 100  -AC      Intra Treatment Diastolic BP 67  -AC      Post SpO2 (%) 94  -AC      O2 Delivery Post Treatment room air  -AC      Recorded by [AC] Esmer Peraza, OT      Pain Assessment    Pain Assessment No/denies pain  -AC 0-10  -LS No/denies pain  -AN    Recorded by [AC] Esmer Peraza, OT [LS] Stephani Morris,  PT [AN] Karina Bagley, OT    Cognitive Assessment/Intervention    Current Cognitive/Communication Assessment functional  -AC  functional  -AN    Orientation Status oriented x 4  -AC  oriented x 4  -AN    Follows Commands/Answers Questions 100% of the time  -AC  100% of the time  -AN    Personal Safety impulsive  -AC  WNL/WFL  -AN    Recorded by [AC] Esmer Peraza OT  [AN] Karina Bagley, OT    Bed Mobility, Assessment/Treatment    Bed Mob, Supine to Sit, [place] independent  -AC independent  -LS independent  -AN    Bed Mob, Sit to Supine, [place] independent  -AC independent  -LS independent  -AN    Recorded by [AC] Esmer Peraza, OT [LS] Stephani Morris, PT [AN] Karnia Bagley, OT    Transfer Assessment/Treatment    Transfers, Sit-Stand [place] stand by assist  -AC stand by assist   1 LOB upon initial standing causing pt to sit back on bed  -LS stand by assist  -AN    Transfers, Stand-Sit [place] stand by assist  -AC stand by assist  -LS stand by assist  -AN    Recorded by [AC] Esmer Peraza, OT [LS] Stephani Morris, PT [AN] Karina Bagley, OT    Gait Assessment/Treatment    Gait, [place] Level  minimum assist (75% patient effort)   CGA to min A  -LS     Gait, Distance (Feet)  400  -LS     Gait, Gait Deviations  forward flexed posture  -LS     Gait, Comment  Pt amb with CGA to occasional min A for balance  -LS     Recorded by  [LS] Stephani Morris, PT     Functional Mobility    Functional Mobility- Ind. Level contact guard assist  -AC      Functional Mobility- Device --   gait belt  -AC      Functional Mobility-Distance (Feet) 350  -AC      Functional Mobility- Comment Pt with rushed pace, requiring cues for safety  -AC      Recorded by [AC] Esmer Peraza, OT      Balance Skills Training    Standing-Level of Assistance  Minimum assistance   CGA to min A  -LS     Standing-Balance Activities  Weight Shift A-P;Weight Shift R-L  -LS     Recorded by  [LS] Stephani Morris, PT      Therapy Exercises    Bilateral Lower Extremities AROM:;20 reps;SLR;ankle pumps/circles;hip abduction/adduction  -AC AROM:;20 reps;supine;ankle pumps/circles;SLR  -LS AROM:;10 reps;sitting;hip abduction/adduction;hip flexion;knee flexion  -AN    Bilateral Upper Extremity AROM:;20 reps;elbow flexion/extension;shoulder extension/flexion;shoulder horizontal abd/add  -AC AROM:;20 reps;supine;elbow flexion/extension;shoulder abduction/adduction;shoulder extension/flexion  -LS AROM:;10 reps;15 reps;supine  -AN    Recorded by [AC] Esmer Peraza, OT [LS] Stephani Morris, PT [AN] Karina Bagley OT    Positioning and Restraints    Pre-Treatment Position in bed  -AC in bed  -LS in bed  -AN    Post Treatment Position bed  -AC bed  -LS bed  -AN    In Bed notified nsg;supine;call light within reach;encouraged to call for assist;exit alarm on  -AC notified nsg;supine;call light within reach   multiple visitors  -LS supine;call light within reach;encouraged to call for assist  -AN    Recorded by [AC] Esmer Peraza, OT [LS] Stephani Morris, PT [AN] Karina Bagley, OT      User Key  (r) = Recorded By, (t) = Taken By, (c) = Cosigned By    Initials Name Effective Dates    AC Esmer Peraza, OT 06/23/15 -     AN Karina Bagley, OT 06/22/15 -     LS Stephani Morris, PT 06/19/15 -                 OT Goals       04/23/17 1546 04/21/17 1551 04/20/17 1152    Transfer Training OT LTG    Transfer Training OT LTG, Date Established   04/20/17  -AR    Transfer Training OT LTG, Time to Achieve   1 wk  -AR    Transfer Training OT LTG, Activity Type   sit to stand/stand to sit;toilet  -AR    Transfer Training OT LTG, [place] Level   supervision required  -AR    Transfer Training OT LTG, Outcome goal partially met   met sit to stand  -AC goal ongoing  -AN     Range of Motion OT LTG    Range of Motion Goal OT LTG, Date Established   04/20/17  -AR    Range of Motion Goal OT LTG, Time to Achieve   1 wk  -AR    Range of Motion Goal OT LTG,  AROM Measure   Pt will complete BUE AROM 2 sets x 10 reps to support ADL function   -AR    Range of Motion Goal OT LTG, Outcome goal ongoing  -AC goal ongoing  -AN     Patient Education OT LTG    Patient Education OT LTG, Date Established   04/20/17  -AR    Patient Education OT LTG, Time to Achieve   1 wk  -AR    Patient Education OT LTG, Education Type   HEP;home safety;energy conservation;work simplification;adaptive breathing  -AR    Patient Education OT LTG, Education Understanding   demonstrates adequately;verbalizes understanding  -AR    Patient Education OT LTG Outcome goal ongoing  -AC goal ongoing  -AN     LB Dressing OT LTG    LB Dressing Goal OT LTG, Date Established   04/20/17  -AR    LB Dressing Goal OT LTG, Time to Achieve   1 wk  -AR    LB Dressing Goal OT LTG, Activity Type   Pt will complete LB dressing taks   -AR    LB Dressing Goal OT LTG, [place] Level   verbal cues required;supervision required  -AR    LB Dressing Goal OT LTG, Outcome goal ongoing  -AC goal ongoing  -AN       User Key  (r) = Recorded By, (t) = Taken By, (c) = Cosigned By    Initials Name Provider Type    AC Esmer Peraza, OT Occupational Therapist    MOSES Bagley, OT Occupational Therapist    MAGGI Cox, OT Occupational Therapist          Occupational Therapy Education     Title: PT OT SLP Therapies (Done)     Topic: Occupational Therapy (Done)     Point: ADL training (Done)    Description: Instruct learner(s) on proper safety adaptation and remediation techniques during self care or transfers.   Instruct in proper use of assistive devices.    Learning Progress Summary    Learner Readiness Method Response Comment Documented by Status   Patient Acceptance E VU,NR incorporating PLB into ther act AC 04/23/17 1546 Done    AUGUST Good,FLAVIO RODRIGUEZ,NR Reviewd EC/WS, incorporation of PLB into ADL routine, role of OT, goals of care AR 04/20/17 1151 Done   Family AUGUST Good,FLAVIO RODRIGUEZ,NR Reviewd EC/WS, incorporation of PLB into ADL  routine, role of OT, goals of care AR 04/20/17 1151 Done               Point: Home exercise program (Done)    Description: Instruct learner(s) on appropriate technique for monitoring, assisting and/or progressing therapeutic exercises/activities.    Learning Progress Summary    Learner Readiness Method Response Comment Documented by Status   Patient Acceptance E,D JENNIFER,DU Educated pt on UE therapeutic ex and benefits of ex and ADL's. AN 04/21/17 1551 Done               Point: Precautions (Done)    Description: Instruct learner(s) on prescribed precautions during self-care and functional transfers.    Learning Progress Summary    Learner Readiness Method Response Comment Documented by Status   Patient Eager E,D,TB VU,NR Reviewd EC/WS, incorporation of PLB into ADL routine, role of OT, goals of care AR 04/20/17 1151 Done   Family Eager E,D,TB VU,NR Reviewd EC/WS, incorporation of PLB into ADL routine, role of OT, goals of care AR 04/20/17 1151 Done               Point: Body mechanics (Done)    Description: Instruct learner(s) on proper positioning and spine alignment during self-care, functional mobility activities and/or exercises.    Learning Progress Summary    Learner Readiness Method Response Comment Documented by Status   Patient Eager E,D,TB VU,NR Reviewd EC/WS, incorporation of PLB into ADL routine, role of OT, goals of care AR 04/20/17 1151 Done   Family Eager E,D,TB VU,NR Reviewd EC/WS, incorporation of PLB into ADL routine, role of OT, goals of care AR 04/20/17 1151 Done                      User Key     Initials Effective Dates Name Provider Type Discipline     06/23/15 -  Esmer Peraza, OT Occupational Therapist OT    AN 06/22/15 -  Karina Bagley, OT Occupational Therapist OT    AR 06/22/15 -  Varsha Cox, OT Occupational Therapist OT                  OT Recommendation and Plan  Anticipated Equipment Needs At Discharge: raised toilet seat, tub bench  Anticipated Discharge Disposition: home with  assist  Therapy Frequency: daily (per priority policy)  Plan of Care Review  Plan Of Care Reviewed With: patient  Progress: improving  Outcome Summary/Follow up Plan: Pt with increased tolerance for activity and demo increased inde with transfers.  Pt req cues for PLB with ex, and requires cues for safety as pt is impulsive.          Outcome Measures       04/23/17 1500 04/22/17 1642 04/21/17 1531    How much help from another person do you currently need...    Turning from your back to your side while in flat bed without using bedrails?  4  -LS     Moving from lying on back to sitting on the side of a flat bed without bedrails?  4  -LS     Moving to and from a bed to a chair (including a wheelchair)?  3  -LS     Standing up from a chair using your arms (e.g., wheelchair, bedside chair)?  3  -LS     Climbing 3-5 steps with a railing?  3  -LS     To walk in hospital room?  3  -LS     AM-PAC 6 Clicks Score  20  -LS     How much help from another is currently needed...    Putting on and taking off regular lower body clothing? 3  -AC  3  -AN    Bathing (including washing, rinsing, and drying) 3  -AC  3  -AN    Toileting (which includes using toilet bed pan or urinal) 3  -AC  3  -AN    Putting on and taking off regular upper body clothing 4  -AC  3  -AN    Taking care of personal grooming (such as brushing teeth) 4  -AC  3  -AN    Eating meals 4  -AC  4  -AN    Score 21  -AC  19  -AN    Functional Assessment    Outcome Measure Options  AM-PAC 6 Clicks Basic Mobility (PT)  -LS       User Key  (r) = Recorded By, (t) = Taken By, (c) = Cosigned By    Initials Name Provider Type    AC Esmer Peraza, OT Occupational Therapist    MOSES Bagley, OT Occupational Therapist    JEFFERSON Morris, PT Physical Therapist           Time Calculation:         Time Calculation- OT       04/23/17 1549          Time Calculation- OT    OT Start Time 1500  -AC      Total Timed Code Minutes- OT 25 minute(s)  -AC      OT Received On  04/23/17  -      OT Goal Re-Cert Due Date 04/30/17  -        User Key  (r) = Recorded By, (t) = Taken By, (c) = Cosigned By    Initials Name Provider Type     Esmer Peraza OT Occupational Therapist           Therapy Charges for Today     Code Description Service Date Service Provider Modifiers Qty    [id number]  OT THERAPEUTIC ACT EA 15 MIN 4/23/2017 Esmer Peraza OT GO 2               Esmer Peraza OT  4/23/2017

## 2017-04-23 NOTE — PLAN OF CARE
Problem: Patient Care Overview (Adult)  Goal: Plan of Care Review  Outcome: Ongoing (interventions implemented as appropriate)    04/23/17 0257   Coping/Psychosocial Response Interventions   Plan Of Care Reviewed With patient   Patient Care Overview   Progress progress toward functional goals as expected         Problem: Activity Intolerance (Adult)  Goal: Activity Tolerance  Outcome: Ongoing (interventions implemented as appropriate)    04/23/17 0257   Activity Intolerance (Adult)   Activity Tolerance making progress toward outcome         Problem: Skin Integrity Impairment, Risk/Actual (Adult)  Goal: Skin Integrity/Wound Healing  Outcome: Ongoing (interventions implemented as appropriate)    04/23/17 0257   Skin Integrity Impairment, Risk/Actual (Adult)   Skin Integrity/Wound Healing making progress toward outcome         Problem: Pressure Ulcer (Adult)  Goal: Signs and Symptoms of Listed Potential Problems Will be Absent or Manageable (Pressure Ulcer)  Outcome: Ongoing (interventions implemented as appropriate)    04/23/17 0257   Pressure Ulcer   Problems Assessed (Pressure Ulcer) all   Problems Present (Pressure Ulcer) none

## 2017-04-23 NOTE — PLAN OF CARE
Problem: Patient Care Overview (Adult)  Goal: Plan of Care Review  Outcome: Ongoing (interventions implemented as appropriate)    04/23/17 1546   Coping/Psychosocial Response Interventions   Plan Of Care Reviewed With patient   Patient Care Overview   Progress improving   Outcome Evaluation   Outcome Summary/Follow up Plan Pt with increased tolerance for activity and demo increased inde with transfers. Pt req cues for PLB with ex, and requires cues for safety as pt is impulsive.          Problem: Inpatient Occupational Therapy  Goal: Transfer Training Goal 1 LTG- OT  Outcome: Ongoing (interventions implemented as appropriate)    04/20/17 1152 04/23/17 1546   Transfer Training OT LTG   Transfer Training OT LTG, Date Established 04/20/17 --    Transfer Training OT LTG, Time to Achieve 1 wk --    Transfer Training OT LTG, Activity Type sit to stand/stand to sit;toilet --    Transfer Training OT LTG, [place] Level supervision required --    Transfer Training OT LTG, Outcome --  goal partially met  (met sit to stand)       Goal: Range of Motion Goal LTG- OT  Outcome: Ongoing (interventions implemented as appropriate)    04/20/17 1152 04/23/17 1546   Range of Motion OT LTG   Range of Motion Goal OT LTG, Date Established 04/20/17 --    Range of Motion Goal OT LTG, Time to Achieve 1 wk --    Range of Motion Goal OT LTG, AROM Measure Pt will complete BUE AROM 2 sets x 10 reps to support ADL function  --    Range of Motion Goal OT LTG, Outcome --  goal ongoing       Goal: Patient Education Goal LTG- OT  Outcome: Ongoing (interventions implemented as appropriate)    04/20/17 1152 04/23/17 1546   Patient Education OT LTG   Patient Education OT LTG, Date Established 04/20/17 --    Patient Education OT LTG, Time to Achieve 1 wk --    Patient Education OT LTG, Education Type HEP;home safety;energy conservation;work simplification;adaptive breathing --    Patient Education OT LTG, Education Understanding demonstrates  adequately;verbalizes understanding --    Patient Education OT LTG Outcome --  goal ongoing       Goal: LB Dressing Goal LTG- OT  Outcome: Ongoing (interventions implemented as appropriate)    04/20/17 1152 04/23/17 1546   LB Dressing OT LTG   LB Dressing Goal OT LTG, Date Established 04/20/17 --    LB Dressing Goal OT LTG, Time to Achieve 1 wk --    LB Dressing Goal OT LTG, Activity Type Pt will complete LB dressing taks  --    LB Dressing Goal OT LTG, [place] Level verbal cues required;supervision required --    LB Dressing Goal OT LTG, Outcome --  goal ongoing

## 2017-04-23 NOTE — PROGRESS NOTES
Cardiothoracic Surgery Progress Note      POD # 2 s/p pericardial drain       LOS: 4 days      Chief Complaint: Discomfort from pericardial drain    Subjective:  Doing well.  Denies shortness of breath    Objective:  Vital Signs  Heart Rate:  [82-91] 82  Resp:  [16-20] 16    Physical Exam:   General Appearance: alert, appears stated age and cooperative   Lungs: clear to auscultation, respirations regular, respirations even and respirations unlabored   Heart: regular rhythm & normal rate, normal S1, S2, no murmur, no richelle, no rub and no click   Skin: Incision c/d/i   Drain with serosanguinous drainage  Results:    Results from last 7 days  Lab Units 04/22/17  0734   WBC 10*3/mm3 25.06*   HEMOGLOBIN g/dL 9.6*   HEMATOCRIT % 31.8*   PLATELETS 10*3/mm3 167       Results from last 7 days  Lab Units 04/22/17  0734   SODIUM mmol/L 137   POTASSIUM mmol/L 3.9   CHLORIDE mmol/L 109   TOTAL CO2 mmol/L 23.0   BUN mg/dL 17   CREATININE mg/dL 1.20   GLUCOSE mg/dL 98   CALCIUM mg/dL 8.9         Assessment:  Status post pericardial drain with output of 110 mL in 24 hours    Plan:  Discontinue drain possibly tomorrow if output down  Small retained effusion of ultrasound.  Likely loculated pericardial effusion  Continue pulmonary toilet

## 2017-04-24 NOTE — PLAN OF CARE
Problem: Patient Care Overview (Adult)  Goal: Plan of Care Review  Outcome: Ongoing (interventions implemented as appropriate)    04/24/17 0401   Coping/Psychosocial Response Interventions   Plan Of Care Reviewed With patient   Patient Care Overview   Progress improving         Problem: Activity Intolerance (Adult)  Goal: Identify Related Risk Factors and Signs and Symptoms  Outcome: Ongoing (interventions implemented as appropriate)

## 2017-04-24 NOTE — DISCHARGE SUMMARY
[facility] Medicine Services  DISCHARGE SUMMARY       Date of Admission: 4/19/2017  Date of Discharge:  4/24/2017  Primary Care Physician: Angel Underwood MD  Consulting Physician(s)     Provider Relationship    Deniz Alves MD Consulting Physician    Justin Bass MD Consulting Physician    Clinton Pop MD Consulting Physician        Discharge Diagnoses:  Active Hospital Problems (** Indicates Principal Problem)    Diagnosis Date Noted   • **Hypoxia  [R09.02] 04/19/2017   • Pericardial effusion with cardiac tamponade [I31.3, I31.4] 04/20/2017     Priority: High   • Anemia [D64.9] 04/19/2017     Priority: High   • Chronic fatigue [R53.82] 04/19/2017   • Unintended weight loss [R63.4] 04/19/2017   • Tobacco abuse [Z72.0] 04/19/2017   • FTT (failure to thrive) in adult [R62.7] 04/19/2017   • Thrombocytopenia [D69.6] 10/04/2016          • 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       Presenting Problem/History of Present Illness  Failure to thrive in adult [R62.7]  FTT (failure to thrive) in adult [R62.7]  Anemia [D64.9]     Chief Complaint on Day of Discharge: f/u pericardial effusion   History of Present Illness on Day of Discharge:   Patient seen early and mid morning.  He was doing well both before and after his pericardial drain was discontinued.  Documented output over the past 24 hours was only 10 mL, drain removed mid morning.  Patient continues to report that he is far better than at admission. No chest pain, dyspnea, or fevers.    Hospital Course  Patient is a 76-year-old male with a past history of MDS, CMML, CKD III (past Cr 1.8-1.9) chronic thrombocytopenia, melanoma, CAD with history of CABG, and nephrolithiasis who was admitted on 4/19 as a direct request of Dr. Alves for  evaluation of acute hypoxia with room air oxygen saturation of 77% while attending an outpatient appointment (Room air sats normal on arrival here). Subsequent evaluation disclosed acute anemia and large pericardial effusion with diastolic collapse of the LV and RV concerning for tamponade.     At time of admission, patient had reported a 3 month history of persistent fatigue and marked decreased exercise tolerance with inability to take more than a few steps before having to rest.  He denied chest pain, palpitations, or significant shortness of air.  As his admission pulse oximetry showed 100% saturation on room air, evaluation for pulmonary embolus was not pursued.  A chest x-ray and CT of the chest showed evidence of chronic emphysematous changes as well as a small area of left lower lobe atelectasis.  There is no dominant mass.  He tolerated 2 units of packed red blood cells and his serum hemoglobin stabilized during the remainder of his hospitalization (6.4g at admission, up to 9.7g on day of discharge).  His initial CBC disclosed acute on chronic leukocytosis with concern for relapse or worsening of his MDS/CMML.  As such, Dr. Austin obtain a bone marrow biopsy on 4/20 which showed CMML with 6% blasts. Dr. Austin is aware of the report and plans to follow this up in clinic in 1 month time.  Patient's hemoglobin stabilized will inpatient and he required no further transfusion.  He had no active signs of GI bleeding.  He is up-to-date on his health maintenance colonoscopies.    As part of his evaluation for exertional fatigue and decline, an echocardiogram was obtained that showed moderate aortic regurgitation, normal LVEF of 60%, and surprisingly, a large circumferential pericardial effusion with tamponade physiology, manifest by biventricular collapse during diastole.  This was discovered on 4/20 and the reading cardiologist notified the patient's hospitalist who consulted Indu Pop and Shelia. Of note, the  patient had no hemodynamic compromise during his admission. The patient underwent a pericardiocentesis and a pericardial drain placement on 4/20 by Dr. Bass. The fluid was serosanguinous and dropped to only 10mL/24hrs, prompting drain to be removed on 4/24. A repeat echo on 4/23 showed only a small pericardial effusion. Although the clear cause of his pericardial effusion is undefined, I suspect it is related to his CMML. Patient will follow up with Dr. Bass in 2 weeks. He was counseled clearly to have a low threshold to return to the [facility] ER if he has any signs or symptoms of recurrent effusion.     Patient's fatigue, functional decline, and weight loss is likely due to his CMML and acute on chronic anemia.  An infectious and metabolic workup was relatively unrevealing with normal a.m. cortisol, TSH, serum B12, and ECG. His COPD did not appear to be associated with an acute exacerbation but could be associated with this. Recommend further eval and follow up to be performed at the discretion of his PCP.     Pt was resumed on his home meds at discharge.     Pt will follow up with Dr. Pop in 6 weeks.     Procedures Performed  Procedure(s):  Pericardiocentesis with pericardial drain 4/20       Consults:   Consults     Date and Time Order Name Status Description    4/20/2017 1200 Inpatient Consult to Thoracic Surgery Completed     4/20/2017 1101 Inpatient Consult to Cardiology Completed     4/19/2017 1816 Inpatient Consult to Hematology and Oncology Completed           Pertinent Test Results:    Results from last 7 days  Lab Units 04/24/17  0629 04/22/17  0734 04/21/17  0821   WBC 10*3/mm3 35.04* 25.06* 28.46*   HEMOGLOBIN g/dL 9.7* 9.6* 9.7*   HEMATOCRIT % 33.3* 31.8* 32.9*   PLATELETS 10*3/mm3 133* 167 162         Results from last 7 days  Lab Units 04/22/17  0734 04/21/17  0821 04/20/17  1049 04/19/17  1756   SODIUM mmol/L 137 137 137 140   POTASSIUM mmol/L 3.9 4.0 4.2 4.2   CHLORIDE mmol/L 109 109 110* 111*    TOTAL CO2 mmol/L 23.0 24.0 23.0 15.0*   BUN mg/dL 17 17 22 22   CREATININE mg/dL 1.20 1.40* 1.40* 1.60*   CALCIUM mg/dL 8.9 8.9 8.8 9.4   BILIRUBIN mg/dL  --   --  0.8 0.5   ALK PHOS U/L  --   --  66 58   ALT (SGPT) U/L  --   --  36 25   AST (SGOT) U/L  --   --  48* 38*   GLUCOSE mg/dL 98 91 94 162*     Haptoglobin 46  Folate 7.26  B12 > 2000  TSH 1.032  Cortisol 11.6  Iron 53  Ferritin 130  TIBC 204  Iron Saturation 26  Troponin 0.017        Bone Marrow Exam   Order: [id number]   Status:  Final result   Visible to patient:  No (Not Released)      4d ago     Final Diagnosis   PERIPHERAL BLOOD, BONE MARROW ASPIRATE, CLOT SECTION AND BIOPSY WITH FLOW CYTOMETRY:  Findings compatible with chronic myelomonocytic leukemia with approximately 6% blasts and promonocytes present (see Comment).              Imaging Results (all)     Procedure Component Value Units Date/Time    CT Chest Without Contrast [[id number]] Collected:  04/20/17 1055     Updated:  04/20/17 1619    Narrative:       EXAMINATION: CT CHEST WO CONTRAST-      INDICATION: Hypoxia, evaluate for parenchymal disease or mass.     TECHNIQUE: CT data set of the chest and mediastinum was performed  without intravenous contrast.     The radiation dose reduction device was turned on for each scan per the  ALARA (As Low as Reasonably Achievable) protocol.     COMPARISON: Compared to chest radiographs of earlier today, 04/19/2017.     FINDINGS:   1. The patient is significantly wasted with reduced complement of  muscular and soft tissues.     2. There is centrilobular emphysema diffusely. There is obstructive lung  disease with scattered micronodular postinflammatory scarring.     In addition, however, there is a small effusion at the left lung base  and there is linear stranding and opacity at the left lung base  posteriorly. The right lung is essentially otherwise clear.     3. The thoracic inlet is unremarkable. Mediastinal mass is not  identified.  Scattered normal lymph nodes are seen in the aortopulmonary  window. Axillae are clear with scattered normal physiologic lymph nodes.   The cardiac chambers are borderline for size. There is no pericardial  effusion.     4. The images into the upper abdomen are nonrevealing with the exception  of mild splenomegaly.       Impression:       1. Centrilobular emphysema and obstructive lung disease is noted with  postinflammatory micronodular scarring diffusely.  2. Otherwise, there is a trace effusion left base with mild linear  nodular stranding airspace opacity in the left lower lobe. There is mild  cardiomegaly without pericardial effusion. There is mild splenomegaly.  3. Overall, the active parenchymal disease at the left base is minimal,  however, global centrilobular emphysema and obstructive lung  abnormalities are impressive and diffuse.     D:  04/20/2017  E:  04/20/2017     This report was finalized on 4/20/2017 4:17 PM by Dr. Angel Alvarez MD.       XR Chest PA & Lateral [[id number]] Collected:  04/20/17 0943     Updated:  04/21/17 1225    Narrative:       EXAMINATION: XR CHEST PA AND LATERAL- 04/19/2017     INDICATION: dyspnea, hypoxia      COMPARISON: NONE     FINDINGS: PA and lateral views of the chest reveal heart to be  borderline enlarged. The lung fields are grossly clear. No focal  parenchymal opacification present. No pleural effusion or pneumothorax.  Degenerative changes seen within the spine. Pulmonary vascularity is  within normal limits. Vascular calcifications are present.           Impression:       Underlying chronic changes identified within the brain with  no acute intracranial abnormality present.     D:  04/20/2017  E:  04/20/2017     This report was finalized on 4/21/2017 12:23 PM by Dr. Sujata Porter MD.       XR Chest 1 View [[id number]] Collected:  04/21/17 0944     Updated:  04/21/17 1521    Narrative:       EXAMINATION: XR CHEST 1 VIEW-04/20/2017:      INDICATION: Postop;  Z74.09-Other reduced mobility; Z74.09-Other reduced  mobility.      COMPARISON: 04/19/2017.     FINDINGS: Wire sternal sutures are manifestations of bypass surgery. The  heart is at the upper limits of normal size. The aortic arch is  partially calcified. There is a suggestion of a catheter in the  pericardial space. The lungs are free of opacities. The osseous  structures of the chest are normal. There is no pneumothorax or  effusion.           Impression:       1.  No acute chest pathology.  2.  Catheter in pericardial space.     D:  04/21/2017  E:  04/21/2017     This report was finalized on 4/21/2017 3:18 PM by Dr. Per Joya MD.       XR Chest 1 View [[id number]] Collected:  04/21/17 1002     Updated:  04/21/17 1535    Narrative:       EXAMINATION: XR CHEST 1 VW- 04/21/2017     INDICATION: postop; Z74.09-Other reduced mobility     COMPARISON: 04/20/2017     FINDINGS: Wire sternal sutures are manifestations of bypass surgery. The  heart size is normal. A catheter projects over the left hemithorax. The  lungs are free of opacities. The osseous structures of the chest are  grossly unremarkable.           Impression:       No acute chest pathology, stable.     D:  04/21/2017  E:  04/21/2017     This report was finalized on 4/21/2017 3:32 PM by Dr. Per Joya MD.       XR Chest 1 View [[id number]] Collected:  04/23/17 1459     Updated:  04/23/17 1506    Narrative:          EXAMINATION: XR CHEST 1 VW - 04/23/2017     INDICATION: pst op; Z74.09-Other reduced mobility.     COMPARISON: 4/21/2017     FINDINGS: There is a normal cardiac silhouette. There are stable chronic  pulmonary changes with no acute process.           Impression:       Stable chronic pulmonary change, insignificantly changed  when compared with 4/21/2017.     DICTATED:     04/23/2017  EDITED:         04/23/2017     This report was finalized on 4/23/2017 3:04 PM by Dr. Christopher Brown MD.       XR Chest 1 View [[id number]] Collected:  04/24/17 1032      "Updated:  04/24/17 1032    Narrative:       EXAMINATION: XR CHEST 1 VIEW-04/24/2017:      INDICATION: Postop; Z74.09-Other reduced mobility; Z74.09-Other reduced  mobility.      COMPARISON: 04/23/2017.     FINDINGS: Portable chest reveals tubes seen overlying the upper abdomen.  The heart is borderline enlarged. The lung fields are grossly clear. No  focal parenchymal opacification present. Degenerative changes seen  within the spine. Vascular calcification is identified. Pulmonary  vascularity is within normal limits. Degenerative changes seen within  the spine.           Impression:       Chronic changes seen throughout the lung fields with  borderline enlargement of the heart. No new focal parenchymal  opacification present.     D:  04/24/2017  E:  04/24/2017                 Transthoracic Echocardiogram 4/20  Interpretation Summary      · Left ventricular function is normal. Estimated EF = 60%.  · Left atrial cavity size is mildly dilated.  · Moderate tricuspid valve regurgitation is present.  · Mild pulmonic valve regurgitation is present.  · Moderate aortic valve regurgitation is present.  · large circumferential pericardial effusion with tamponade physiology with significant RV RA diastolic collapse but also left cardiac chambers collapse     Transthoracic Echocardiogram 4/23  Interpretation Summary      · Small effusion with no tamponade       Condition on Discharge:  stable    Physical Exam on Discharge:BP 94/66 (BP Location: Right arm, Patient Position: Lying)  Pulse 84  Temp 98.1 °F (36.7 °C) (Oral)   Resp 22  Ht 72\" (182.9 cm)  Wt 130 lb 1.1 oz (59 kg)  SpO2 97%  BMI 17.64 kg/m2  Physical Exam  Constitutional: no acute distress, awake, alert, cachexic, chronically ill appearing   Respiratory: Clear to auscultation but diminished bilaterally, no wheezing, nonlabored respirations   Cardiovascular: RRR, no murmurs, rubs, or gallops, palpable pedal pulses bilaterally, pericardial drain removed "   Gastrointestinal: Positive bowel sounds, soft, nontender, nondistended  Musculoskeletal: No bilateral ankle edema  Psychiatric: oriented x 3, appropriate affect, cooperative  Neurologic: Strength symmetric in all extremities       Discharge Disposition  Home or Self Care    Discharge Medications   Jerod Zelaya   Home Medication Instructions JAMISON:[id number]    Printed on:04/24/17 1006   Medication Information                      aspirin 81 MG EC tablet  Take 81 mg by mouth daily.             cyclobenzaprine (FLEXERIL) 10 MG tablet               isosorbide mononitrate (IMDUR) 30 MG 24 hr tablet               Multiple Vitamins-Minerals (MULTIVITAMIN ADULT PO)  Take 1 tablet by mouth daily.             PROAIR  (90 BASE) MCG/ACT inhaler                   Discharge Diet:   Diet Instructions     Advance Diet As Tolerated                     Discharge Care Plan / Instructions:  - Pt counseled in detail on symptoms that would warrant return to ER    Activity at Discharge:   Activity Instructions     Activity as Tolerated                     Follow-up Appointments  No future appointments.  Additional Instructions for the Follow-ups that You Need to Schedule     Additional Discharge Follow-Up (Specify Provider)    As directed    To:  Dr. Bass   Follow Up:  2 Weeks       Additional Discharge Follow-Up (Specify Provider)    As directed    To:  Dr. Austin   Follow Up:  1 Month       Discharge Follow-up with PCP    As directed    Follow Up Details:  Dr. Underwood within 1-2 weeks                 Test Results Pending at Discharge   Order Current Status    Bone Marrow Exam In process    CBC & Differential In process    Manual Differential In process    CBC Auto Differential Preliminary result           Aman Eckert MD 04/24/17 10:06 AM    Time: Discharge 40 min    Please note that portions of this note may have been completed with a voice recognition program. Efforts were made to edit the dictations, but  occasionally words are mistranscribed.

## 2017-04-24 NOTE — PROGRESS NOTES
[facility]       LOS: 5 days   Patient Care Team:  Angel Underwood MD as PCP - General (Family Medicine)  Deniz Alves MD as PCP - Claims Attributed - PLEASE DO NOT REMOVE        Subjective       Patient Denies:  Cp, sob, palps.  Feel good.  Want to get out of here      Vital Signs  Temp:  [97.6 °F (36.4 °C)-98.1 °F (36.7 °C)] 98.1 °F (36.7 °C)  Heart Rate:  [] 84  Resp:  [16-22] 22  BP: ()/(66-67) 94/66    Intake/Output Summary (Last 24 hours) at 04/24/17 1031  Last data filed at 04/24/17 0603   Gross per 24 hour   Intake              100 ml   Output              864 ml   Net             -764 ml          Physical Exam:     General Appearance:    Alert, cooperative, in no acute distress       Neck:   No adenopathy, supple, trachea midline, no JVD       Lungs:     Decreased at bases, respirations regular, even and                  unlabored    Heart:    Regular rhythm and normal rate, normal S1 and S2, no            murmur, no gallop, no rub, no click   Chest Wall:    No abnormalities observed   Abdomen:     Normal bowel sounds, no masses, no organomegaly, soft               Extremities:   Moves all extremities well, no edema, no cyanosis, no             redness   Pulses:   Pulses palpable and equal bilaterally     Results Review:     I reviewed the patient's new clinical results.      WBC WBC   Date/Time Value Ref Range Status   04/24/2017 0629 35.04 (H) 3.50 - 10.80 10*3/mm3 Final   04/22/2017 0734 25.06 (H) 3.50 - 10.80 10*3/mm3 Final            HGB Hemoglobin   Date/Time Value Ref Range Status   04/24/2017 0629 9.7 (L) 13.1 - 17.5 g/dL Final   04/22/2017 0734 9.6 (L) 13.1 - 17.5 g/dL Final           HCT Hematocrit   Date/Time Value Ref Range Status   04/24/2017 0629 33.3 (L) 38.9 - 50.9 % Final   04/22/2017 0734 31.8 (L) 38.9 - 50.9 % Final            Platlets No results found for: LABPLAT  Sodium  Sodium   Date/Time Value Ref  Range Status   04/22/2017 0734 137 132 - 146 mmol/L Final     Potassium  Potassium   Date/Time Value Ref Range Status   04/22/2017 0734 3.9 3.5 - 5.5 mmol/L Final     Chloride  Chloride   Date/Time Value Ref Range Status   04/22/2017 0734 109 99 - 109 mmol/L Final     BicarbonateNo results found for: PLASMABICARB    BUN BUN   Date/Time Value Ref Range Status   04/22/2017 0734 17 9 - 23 mg/dL Final      Creatinine Creatinine   Date/Time Value Ref Range Status   04/22/2017 0734 1.20 0.60 - 1.30 mg/dL Final      Calcium Calcium   Date/Time Value Ref Range Status   04/22/2017 0734 8.9 8.7 - 10.4 mg/dL Final      Mag No results found for: MG        PT/INR:    No results found for: PROTIME/  No results found for: INR  Troponin I     Lab Results   Component Value Date    TROPONINI 0.017 04/19/2017         ipratropium-albuterol 3 mL Nebulization 4x Daily - RT   isosorbide mononitrate 30 mg Oral Q24H   nicotine 1 patch Transdermal Q24H       lactated ringers 9 mL/hr Last Rate: 9 mL/hr (04/20/17 1804)       Assessment/Plan     Patient Active Problem List   Diagnosis Code   • Myelodysplasia, low grade D46.20   • CMML (chronic myelomonocytic leukemia) C93.10   • Thrombocytopenia D69.6   • Hypoxia  R09.02   • Chronic fatigue R53.82   • Unintended weight loss R63.4   • Tobacco abuse Z72.0   • FTT (failure to thrive) in adult R62.7   • Anemia D64.9   • Pericardial effusion with cardiac tamponade I31.3, I31.4     CV stable post pericardial window  F/u 6 weeks  Ok home    FERNANDO Ghotra  04/24/17  10:31 AM

## 2017-04-24 NOTE — PROGRESS NOTES
Cardiothoracic Surgery Progress Note      POD # 4 s/p pericardial drain       LOS: 5 days      Subjective:  No complaints sitting in chair comfortably    Objective:  Vital Signs  Temp:  [97.6 °F (36.4 °C)-98.1 °F (36.7 °C)] 98.1 °F (36.7 °C)  Heart Rate:  [] 84  Resp:  [16-22] 22  BP: ()/(66-67) 94/66    Physical Exam:   General Appearance: alert, appears stated age and cooperative   Lungs: Unlabored   Heart: Clear        Results:    Results from last 7 days  Lab Units 04/24/17  0629   WBC 10*3/mm3 35.04*   HEMOGLOBIN g/dL 9.7*   HEMATOCRIT % 33.3*   PLATELETS 10*3/mm3 133*       Results from last 7 days  Lab Units 04/22/17  0734   SODIUM mmol/L 137   POTASSIUM mmol/L 3.9   CHLORIDE mmol/L 109   TOTAL CO2 mmol/L 23.0   BUN mg/dL 17   CREATININE mg/dL 1.20   GLUCOSE mg/dL 98   CALCIUM mg/dL 8.9         Assessment:  Postoperative day 4 status post pericardial drain with decreased, output minimal at this point the past 24 hours    Plan:  Per Dr. Bass we will discontinue the drain this morning  And okay to discharge home from a surgery standpoint  FERNANDO Scott  04/24/17  9:00 AM

## 2017-04-24 NOTE — PROGRESS NOTES
[facility]       LOS: 5 days   Patient Care Team:  Angel Underwood MD as PCP - General (Family Medicine)  Deniz Alves MD as PCP - Claims Attributed - PLEASE DO NOT REMOVE        Subjective       Patient Denies:  Cp, sob, palps.        Vital Signs  Temp:  [97.6 °F (36.4 °C)-98.1 °F (36.7 °C)] 98.1 °F (36.7 °C)  Heart Rate:  [] 84  Resp:  [16-22] 22  BP: ()/(66-67) 94/66    Intake/Output Summary (Last 24 hours) at 04/24/17 0854  Last data filed at 04/24/17 0603   Gross per 24 hour   Intake              100 ml   Output              864 ml   Net             -764 ml          Physical Exam:     General Appearance:    Alert, cooperative, in no acute distress       Neck:   No adenopathy, supple, trachea midline, no JVD       Lungs:     Decreased at bases, respirations regular, even and                  unlabored    Heart:    Regular rhythm and normal rate, normal S1 and S2, no            murmur, no gallop, no rub, no click   Chest Wall:    No abnormalities observed   Abdomen:     Normal bowel sounds, no masses, no organomegaly, soft               Extremities:   Moves all extremities well, no edema, no cyanosis, no             redness   Pulses:   Pulses palpable and equal bilaterally     Results Review:     I reviewed the patient's new clinical results.      WBC WBC   Date/Time Value Ref Range Status   04/24/2017 0629 35.04 (H) 3.50 - 10.80 10*3/mm3 Preliminary   04/22/2017 0734 25.06 (H) 3.50 - 10.80 10*3/mm3 Final            HGB Hemoglobin   Date/Time Value Ref Range Status   04/24/2017 0629 9.7 (L) 13.1 - 17.5 g/dL Preliminary   04/22/2017 0734 9.6 (L) 13.1 - 17.5 g/dL Final           HCT Hematocrit   Date/Time Value Ref Range Status   04/24/2017 0629 33.3 (L) 38.9 - 50.9 % Preliminary   04/22/2017 0734 31.8 (L) 38.9 - 50.9 % Final            Platlets No results found for: LABPLAT  Sodium  Sodium   Date/Time Value Ref Range Status    04/22/2017 0734 137 132 - 146 mmol/L Final     Potassium  Potassium   Date/Time Value Ref Range Status   04/22/2017 0734 3.9 3.5 - 5.5 mmol/L Final     Chloride  Chloride   Date/Time Value Ref Range Status   04/22/2017 0734 109 99 - 109 mmol/L Final     BicarbonateNo results found for: PLASMABICARB    BUN BUN   Date/Time Value Ref Range Status   04/22/2017 0734 17 9 - 23 mg/dL Final      Creatinine Creatinine   Date/Time Value Ref Range Status   04/22/2017 0734 1.20 0.60 - 1.30 mg/dL Final      Calcium Calcium   Date/Time Value Ref Range Status   04/22/2017 0734 8.9 8.7 - 10.4 mg/dL Final      Mag No results found for: MG        PT/INR:    No results found for: PROTIME/  No results found for: INR  Troponin I     Lab Results   Component Value Date    TROPONINI 0.017 04/19/2017         ipratropium-albuterol 3 mL Nebulization 4x Daily - RT   isosorbide mononitrate 30 mg Oral Q24H   nicotine 1 patch Transdermal Q24H       lactated ringers 9 mL/hr Last Rate: 9 mL/hr (04/20/17 1804)       Assessment/Plan     Patient Active Problem List   Diagnosis Code   • Myelodysplasia, low grade D46.20   • CMML (chronic myelomonocytic leukemia) C93.10   • Thrombocytopenia D69.6   • Hypoxia  R09.02   • Chronic fatigue R53.82   • Unintended weight loss R63.4   • Tobacco abuse Z72.0   • FTT (failure to thrive) in adult R62.7   • Anemia D64.9   • Pericardial effusion with cardiac tamponade I31.3, I31.4     CV stable  Repeat echo      FERNANDO Ghotra  04/24/17  8:54 AM

## 2017-04-27 NOTE — THERAPY DISCHARGE NOTE
Acute Care - Occupational Therapy Discharge Summary  [facility]     Patient Name: Jerod Zelaya  : 1940  MRN: [id number]    Today's Date: 2017  Onset of Illness/Injury or Date of Surgery Date: 17    Date of Referral to OT: 17  Referring Physician: Mayne, PA      Admit Date: 2017        OT Recommendation and Plan    Visit Dx:    ICD-10-CM ICD-9-CM   1. Impaired functional mobility, balance, gait, and endurance Z74.09 V49.89   2. Impaired mobility and ADLs Z74.09 799.89                     OT Goals       17 1546 17 1551 17 1152    Transfer Training OT LTG    Transfer Training OT LTG, Date Established   17  -AR    Transfer Training OT LTG, Time to Achieve   1 wk  -AR    Transfer Training OT LTG, Activity Type   sit to stand/stand to sit;toilet  -AR    Transfer Training OT LTG, [place] Level   supervision required  -AR    Transfer Training OT LTG, Outcome goal partially met   met sit to stand  -AC goal ongoing  -AN     Range of Motion OT LTG    Range of Motion Goal OT LTG, Date Established   17  -AR    Range of Motion Goal OT LTG, Time to Achieve   1 wk  -AR    Range of Motion Goal OT LTG, AROM Measure   Pt will complete BUE AROM 2 sets x 10 reps to support ADL function   -AR    Range of Motion Goal OT LTG, Outcome goal ongoing  -AC goal ongoing  -AN     Patient Education OT LTG    Patient Education OT LTG, Date Established   17  -AR    Patient Education OT LTG, Time to Achieve   1 wk  -AR    Patient Education OT LTG, Education Type   HEP;home safety;energy conservation;work simplification;adaptive breathing  -AR    Patient Education OT LTG, Education Understanding   demonstrates adequately;verbalizes understanding  -AR    Patient Education OT LTG Outcome goal ongoing  -AC goal ongoing  -AN     LB Dressing OT LTG    LB Dressing Goal OT LTG, Date Established   17  -AR    LB Dressing Goal OT LTG, Time to Achieve   1 wk  -AR    LB Dressing  Goal OT LTG, Activity Type   Pt will complete LB dressing taks   -AR    LB Dressing Goal OT LTG, [place] Level   verbal cues required;supervision required  -AR    LB Dressing Goal OT LTG, Outcome goal ongoing  -AC goal ongoing  -AN       User Key  (r) = Recorded By, (t) = Taken By, (c) = Cosigned By    Initials Name Provider Type    AC Esmer Peraza, OT Occupational Therapist    MOSES Bagley, OT Occupational Therapist    MAGGI Cox, OT Occupational Therapist                  OT Discharge Summary  Reason for Discharge: Discharge from facility  Outcomes Achieved: Refer to plan of care for updates on goals achieved  Discharge Destination: Home      Tana Baer OT  4/27/2017

## 2017-04-27 NOTE — THERAPY DISCHARGE NOTE
Acute Care - Physical Therapy Discharge Summary  [facility]       Patient Name: Jerod Zelaya  : 1940  MRN: [id number]    Today's Date: 2017  Onset of Illness/Injury or Date of Surgery Date: 17    Date of Referral to PT: 17  Referring Physician: Mayne, PA      Admit Date: 2017      PT Recommendation and Plan    Visit Dx:    ICD-10-CM ICD-9-CM   1. Impaired functional mobility, balance, gait, and endurance Z74.09 V49.89   2. Impaired mobility and ADLs Z74.09 799.89                       IP PT Goals       17 1705 17 1147       Bed Mobility PT LTG    Bed Mobility PT LTG, Date Established  17  -MJ     Bed Mobility PT LTG, Time to Achieve  5 days  -MJ     Bed Mobility PT LTG, Activity Type  supine to sit/sit to supine  -MJ     Bed Mobility PT LTG, [place] Level  independent  -MJ     Bed Mobility PT LTG, Date Goal Reviewed  17  -MJ     Bed Mobility PT LTG, Outcome  goal met  -MJ     Transfer Training PT LTG    Transfer Training PT LTG, Date Established  17  -MJ     Transfer Training PT LTG, Time to Achieve  5 days  -MJ     Transfer Training PT LTG, Activity Type  sit to stand/stand to sit  -MJ     Transfer Training PT LTG, [place] Level  independent  -MJ     Transfer Training PT LTG, Outcome goal ongoing  -LS      Gait Training PT LTG    Gait Training Goal PT LTG, Date Established  17  -MJ     Gait Training Goal PT LTG, Time to Achieve  1 wk  -MJ     Gait Training Goal PT LTG, [place] Level  independent  -MJ     Gait Training Goal PT LTG, Distance to Achieve  350 feet  -MJ     Gait Training Goal PT LTG, Outcome goal ongoing  -LS        User Key  (r) = Recorded By, (t) = Taken By, (c) = Cosigned By    Initials Name Provider Type    LS Stephani Morris, PT Physical Therapist    WANG Smith, PT Physical Therapist            Goals Status: Treatment plan discontinued secondary to discharge from acute facility.  PT Discharge  Summary  Reason for Discharge: Discharge from facility  Outcomes Achieved: Refer to plan of care for updates on goals achieved  Discharge Destination: Home with assist      Nancy Cordero, PT   4/26/2017

## 2017-04-28 PROBLEM — J43.1 PANLOBULAR EMPHYSEMA (HCC): Status: ACTIVE | Noted: 2017-01-01

## 2017-04-28 NOTE — PROGRESS NOTES
"PROBLEM LIST:  . CMML/MDS diagnosed in 11/2014.   2. Patient initiated on Vidaza at [facility] in [place], [place] in 12/2014.   3. Thrombocytopenia in the 70,000 range. At the time of diagnosis his  platelet count was around the same range.  4. Completed 6 cycles of Vidaza on 06/05/2015; has not been receiving Vidaza  for the last 6 months with stable platelet count.   5. Today his platelet count is 73,000. With some aggregation.  6. Cardiac tamponade - Apr 2017 - pericardiocentesis by Dr. Bass - Unknown cause  7. Severe COPD with RA sat of 78% - 98% on 2 L      REASON FOR VISIT: Shortness of breath    HISTORY OF PRESENT ILLNESS:   76-year-old gentleman with CMML presents today for follow-up.  He asked to come in early due to severe shortness of breath.  Upon arriving he was noted to have fair bit of hypoxemia with room air sats of 78%.  His placed on oxygen which improved her to 98%.  He reports fair bit of fatigue with ambulation.  He is scheduled to see Dr. Bass on May 16.  Clinically seems to be doing reasonably well.   He seems somewhat improved from his recent hospitalization also.    Past medical history, social history and family history was reviewed and unchanged from prior visit.    Review of Systems:    Review of Systems - Oncology   A comprehensive 14 point review of systems was performed and was negative except as mentioned.      Medications:  The current medication list was reviewed in the EMR    ALLERGIES:    Allergies   Allergen Reactions   • Penicillins          Physical Exam    VITAL SIGNS:  /74  Pulse 84  Temp 97.8 °F (36.6 °C) (Temporal Artery )   Resp 16  Ht 72\" (182.9 cm)  Wt 130 lb (59 kg)  SpO2 (!) 63% Comment: room air  BMI 17.63 kg/m2     Performance Status: 3    General: chronically ill appearing, in no acute distress  HEENT: sclera anicteric, oropharynx clear, neck is supple  Lymphatics: no cervical, supraclavicular, or axillary " adenopathy  Cardiovascular: regular rate and rhythm, no murmurs, rubs or gallops  Lungs: clear to auscultation bilaterally  Abdomen: soft, nontender, nondistended.  No palpable organomegaly  Extremities: no lower extremity edema  Skin: no rashes, lesions, bruising, or petechiae  Msk:  Shows no weakness of the large muscle groups  Psych: Mood is stable        RECENT LABS:    Lab Results   Component Value Date    WBC 35.04 (H) 04/24/2017    HGB 9.7 (L) 04/24/2017    HCT 33.3 (L) 04/24/2017    MCV 93.5 04/24/2017     (L) 04/24/2017      Lab Results   Component Value Date    GLUCOSE 98 04/22/2017    BUN 17 04/22/2017    CREATININE 1.20 04/22/2017    EGFRIFNONA 59 (L) 04/22/2017    BCR 14.2 04/22/2017    CO2 23.0 04/22/2017    CALCIUM 8.9 04/22/2017    ALBUMIN 3.50 04/20/2017    LABIL2 1.1 (L) 04/20/2017    AST 48 (H) 04/20/2017    ALT 36 04/20/2017         Assessment/Plan    1.  CMML: Bone marrow biopsy shows stable disease.  No significant worsening or conversion to acute leukemia.  2.  Severe hypoxia likely due to COPD.  We'll try to arrange for home O2.  Since his sats on room air is well below 88%.  I will also refer him to pulmonary.  3.  Cardiac tamponade not due to pericardial effusion: Unknown cause.  He is to follow-up with CT surgery and cardiology.        Deniz Alves MD  [facility] Hematology and Oncology    4/28/2017         Please note that portions of this note may have been completed with a voice recognition program. Efforts were made to edit the dictations, but occasionally words are mistranscribed.

## 2017-05-11 PROBLEM — J43.2 CENTRILOBULAR EMPHYSEMA (HCC): Status: ACTIVE | Noted: 2017-01-01

## 2017-06-06 NOTE — TELEPHONE ENCOUNTER
Patients daughter stated that patient was not interested in our Pulmonary Rehabilitation program, daughter is the main transportation for parents and is unable to get Mr. Zelaya to [place] two to three times a week. Patient would like to Pulmonary Rehabilitation when [facility] starts their program.

## 2017-06-07 NOTE — TELEPHONE ENCOUNTER
----- Message from Shasha Bar RN sent at 6/6/2017 12:24 PM EDT -----  Regarding: critical      Critical Test Results  WBC 17.1      MD: Dr Austin    Date: 6/6/17     Critical test result: WBC 17.1    Time results received:  1215pm

## 2017-06-14 NOTE — PROGRESS NOTES
Critical Test Results      MD: Dr Austin    Date: 6/14/17     Critical test result:  WBC 55.2    Time results received: 840 am        Dr. Austin notified and no new orders

## 2017-06-21 NOTE — PROGRESS NOTES
"PROBLEM LIST:  . CMML/MDS diagnosed in 11/2014.   2. Patient initiated on Vidaza at [facility] in [place], [place] in 12/2014.   3. Thrombocytopenia in the 70,000 range. At the time of diagnosis his  platelet count was around the same range.  4. Completed 6 cycles of Vidaza on 06/05/2015; has not been receiving Vidaza  for the last 6 months with stable platelet count.   5. Today his platelet count is 73,000. With some aggregation.  6. Cardiac tamponade - Apr 2017 - pericardiocentesis by Dr. Bass - Unknown cause  7. Severe COPD with RA sat of 78% - 98% on 2 L      REASON FOR VISIT: Shortness of breath    HISTORY OF PRESENT ILLNESS:   76-year-old gentleman with CMML presents today for follow-up.  Since I last saw him he has had colonoscopy and was found to have AVMs that were bleeding. Clinically still very fatigued and is also exceedingly thin.      Past medical history, social history and family history was reviewed and unchanged from prior visit.    Review of Systems:    Review of Systems   Constitutional: Positive for fatigue.   HENT:  Negative.    Eyes: Negative.    Respiratory: Positive for shortness of breath.    Cardiovascular: Negative.    Gastrointestinal: Positive for blood in stool.   Endocrine: Negative.    Genitourinary: Negative.     Musculoskeletal: Negative.    Skin: Negative.    Neurological: Negative.    Hematological: Negative.    Psychiatric/Behavioral: Negative.       A comprehensive 14 point review of systems was performed and was negative except as mentioned.      Medications:  The current medication list was reviewed in the EMR    ALLERGIES:    Allergies   Allergen Reactions   • Penicillins          Physical Exam    VITAL SIGNS:  BP 92/52  Pulse 80  Temp 98 °F (36.7 °C)  Resp 20  Ht 72\" (182.9 cm)  Wt 127 lb (57.6 kg)  BMI 17.22 kg/m2     Performance Status: 3    General: chronically ill appearing, in no acute distress  HEENT: sclera anicteric, oropharynx " clear, neck is supple  Lymphatics: no cervical, supraclavicular, or axillary adenopathy  Cardiovascular: regular rate and rhythm, no murmurs, rubs or gallops  Lungs: clear to auscultation bilaterally  Abdomen: soft, nontender, nondistended.  No palpable organomegaly  Extremities: no lower extremity edema  Skin: no rashes, lesions, or petechiae, + bruising noted  Msk:  Shows no weakness of the large muscle groups  Psych: Mood is stable        RECENT LABS:    Lab Results   Component Value Date    WBC 54.2 (C) 06/19/2017    HGB 8.0 (L) 06/19/2017    HCT 28.7 (L) 06/19/2017    MCV 92 06/19/2017    PLT 61 (C) 06/19/2017      Lab Results   Component Value Date    GLUCOSE 98 04/22/2017    BUN 17 04/22/2017    CREATININE 1.20 04/22/2017    EGFRIFNONA 59 (L) 04/22/2017    BCR 14.2 04/22/2017    CO2 23.0 04/22/2017    CALCIUM 8.9 04/22/2017    ALBUMIN 3.50 04/20/2017    LABIL2 1.1 (L) 04/20/2017    AST 48 (H) 04/20/2017    ALT 36 04/20/2017         Assessment/Plan    1.  CMML: Bone marrow biopsy shows stable disease.  His CBC reveals a elevated white blood cell count which is decreased to 55.  His platelet count is 61.  I don't think he needs treatment at this time and watchful waiting can be performed.  2.  Severe hypoxia likely due to COPD.    3.  Cardiac tamponade not due to pericardial effusion: Unknown cause.  He has a repeat echo planned for next month.  4.  Failure to thrive: Still unclear about the cause of all his issues.  He has markedly worsened over the last year. Will check cbc once a week and transfuse as necessary  5. GI AVMs: Got [place] treatment with Dr. Hedrick.    We'll see him back my clinic after his EGD and colonoscopy.    Deniz Alves MD  [facility] Hematology and Oncology    6/21/2017         Please note that portions of this note may have been completed with a voice recognition program. Efforts were made to edit the dictations, but occasionally words are mistranscribed.

## 2017-07-11 NOTE — PROGRESS NOTES
07/11/2017  Patient Information  Jerod Zelaya                                                                                          [address] [facility] [place] [address]   1940  'PCP/Referring Physician'  Angel Underwood MD  [phone]  No ref. provider found    Chief Complaint   Patient presents with   • Follow-up     Follow up with echo results. Patient complains of weight loss,fatigue and shortness of breath.   • Pericardial Effusion       History of Present Illness:  Mr. Zelaya presents to office today for follow up of 5/30/17 Echocardiogram, which was ordered following the pericardiocentesis performed in April of this year for the patient's pericardial effusion. Mr. Zelaya states that he has no energy, and despite his diet being good, he still continues to lose weight. He is slated to see Pulmonology later this month to review a CT scan of the chest which was performed on 6/2/17 and showed diffuse centrilobular emphysema and obstructive lung disease with slight increases in bibasilar effusions R>L. Mr. Zelaya also has an appointment to see Dr. Deniz Alves (Oncologist) on August 2nd, 2017 to evaluate his blood levels. The patient's o2 saturation is 85% on room air. He is usually on Home o2 2L, but he did not bring it with him today.     Patient Active Problem List   Diagnosis   • Myelodysplasia, low grade   • CMML (chronic myelomonocytic leukemia)   • Thrombocytopenia   • Hypoxia    • Chronic fatigue   • Unintended weight loss   • Tobacco abuse   • FTT (failure to thrive) in adult   • Anemia   • H/O Pericardial effusion with cardiac tamponade   • Stage III Emphysema     Past Medical History:   Diagnosis Date   • Bleeding    • Cataract    • CMML (chronic myelomonocytic leukemia)    • COPD (chronic obstructive pulmonary disease)    • Coronary artery disease    • Easy bruising    • H/O colonoscopy 07/2014   • Kidney stones    • Melanoma on left side of face     s/p XRT 2016   •  Myelodysplastic syndrome 12/2014    CMML, completed Chemo 6/2016   • Pericardial effusion    • Wears dentures    • Wears glasses      Past Surgical History:   Procedure Laterality Date   • BACK SURGERY     • CATARACT EXTRACTION Bilateral    • CORONARY ARTERY BYPASS GRAFT  2002       Current Outpatient Prescriptions:   •  aspirin 81 MG EC tablet, Take 81 mg by mouth daily., Disp: , Rfl:   •  cyclobenzaprine (FLEXERIL) 10 MG tablet, , Disp: , Rfl:   •  isosorbide mononitrate (IMDUR) 30 MG 24 hr tablet, , Disp: , Rfl:   •  Multiple Vitamins-Minerals (MULTIVITAMIN ADULT PO), Take 1 tablet by mouth daily., Disp: , Rfl:   •  omeprazole (priLOSEC) 20 MG capsule, , Disp: , Rfl:   •  PROAIR  (90 BASE) MCG/ACT inhaler, , Disp: , Rfl:   •  traMADol (ULTRAM) 50 MG tablet, , Disp: , Rfl:   Allergies   Allergen Reactions   • Penicillins      Social History     Social History   • Marital status:      Spouse name: N/A   • Number of children: N/A   • Years of education: N/A     Occupational History   • Not on file.     Social History Main Topics   • Smoking status: Current Every Day Smoker     Packs/day: 1.00     Years: 60.00     Types: Cigarettes   • Smokeless tobacco: Former User   • Alcohol use No   • Drug use: No   • Sexual activity: Not on file     Other Topics Concern   • Not on file     Social History Narrative    Lives with wife in [place], [place]    Previously smoked up to one and a half packs of cigarettes per day.  Currently cut down to approximately one half pack of cigarettes per day    Doesn't drink alcohol     Family History   Problem Relation Age of Onset   • Lung cancer Father    • No Known Problems Mother    • No Known Problems Sister    • No Known Problems Brother    • No Known Problems Daughter      Review of Systems   Constitution: Positive for weakness, malaise/fatigue and weight loss. Negative for chills, fever and night sweats.   HENT: Positive for hearing loss. Negative for headaches,  "odynophagia and sore throat.    Eyes: Negative.    Cardiovascular: Positive for dyspnea on exertion. Negative for chest pain, leg swelling, orthopnea and palpitations.   Respiratory: Positive for shortness of breath and wheezing. Negative for cough and hemoptysis.    Endocrine: Negative.  Negative for cold intolerance, heat intolerance, polydipsia, polyphagia and polyuria.   Hematologic/Lymphatic: Bruises/bleeds easily.   Skin: Negative.  Negative for itching and rash.   Musculoskeletal: Negative.  Negative for joint pain, joint swelling and myalgias.   Gastrointestinal: Negative.  Negative for abdominal pain, constipation, diarrhea, hematemesis, hematochezia, melena, nausea and vomiting.   Genitourinary: Negative.  Negative for dysuria, frequency and hematuria.   Neurological: Positive for dizziness and loss of balance. Negative for focal weakness, numbness and seizures.   Psychiatric/Behavioral: Negative for suicidal ideas. The patient is nervous/anxious.    Allergic/Immunologic: Negative.    All other systems reviewed and are negative.    Vitals:    17 1224   BP: 92/53   BP Location: Right arm   Pulse: 86   Temp: 97.5 °F (36.4 °C)   SpO2: (!) 85%   Weight: 122 lb (55.3 kg)   Height: 72\" (182.9 cm)      Physical Exam:  Gen - NAD, pleasant, cooperative  CV - Regular rate and rhythm, no murmur gallop or rub  Pulm - Breath sounds are distant, but no wheeze or rales noted  GI - Soft, normoactive bowel sounds, non-tender  Ext - Without edema  Skin - Warm and dry     Labs/Imagin17 ECHO   - Right ventricular cavity is mildly dilated   - Left atrial cavity size is mild-to-moderately dilated   - Mild mitral valve regurgitation is present    - Mild to moderate tricuspid valve regurgitation is present   - Left ventricular systolic function is normal. Estimated EF = 65%   - There is a trivial pericardial effusion adjacent to the left ventricle   - Mild pulmonary hypertension is present   - The aortic valve " exhibits sclerosis    Assessment/Plan:  Mr. Zelaya's ECHO showed a 'trivial pericardial effusion' and an EF of 65%. He is encouraged to ambulate more often. He will be seen by his Oncologist, Dr. Alves in early August as well as his Pulmonologist later this month. He may follow up with our service as necessary, but in the meantime, he may call our office at anytime with any questions or concerns he may have.        Patient Active Problem List   Diagnosis   • Myelodysplasia, low grade   • CMML (chronic myelomonocytic leukemia)   • Thrombocytopenia   • Hypoxia    • Chronic fatigue   • Unintended weight loss   • Tobacco abuse   • FTT (failure to thrive) in adult   • Anemia   • H/O Pericardial effusion with cardiac tamponade   • Stage III Emphysema     Signed by:

## 2017-07-24 NOTE — PROGRESS NOTES
[facility] Pulmonary Follow up    CHIEF COMPLAINT    Shortness of breath, chronic respiratory failure    HISTORY OF PRESENT ILLNESS    Jerod Zelaya is a 76 y.o.male here today for follow-up on his CT scan.  He last saw Dr. Chon Hugo in May for follow-up on his COPD and chronic hypoxic respiratory failure.  He has a long history of tobacco abuse with COPD, severe airway obstruction on his PFTs.  He was continued on Stiolto and albuterol as needed.  With continued compliance with his oxygen.  As well as smoking cessation.  He continues on Stiolto daily as well as his albuterol around once a day.  He continues to smoke half pack cigarettes a day.    He comes in today for follow-up and CT scan results.  The CT scan did show some slight increase in some bibasilar effusions.  In April of this year he did have a pericardial effusion with cardiac tampon on and underwent pericardial centesis by Dr. Bass.  Since that time he has had some bilateral pleural effusions.  He follows up with Dr. Bass as an outpatient as well.  His most recent echocardiogram in follow-up in July showed a trivial pericardial effusion with ejection fraction of 65%.    He also has CMML and follows up with Dr. Austin.  With chronic functional decline, weight loss and fatigue.  His most recent bone marrow biopsy showed a stable process.    The majority of his complaint is of severe fatigue and exercise intolerance, he can hardly walk across the room without getting very fatigued and out of breath.  As well as continued weight loss.  He is eating well.  He is eating protein, carbohydrates, and sugar.  He is eating quite a bit of smoothies and milkshakes but continues to lose weight.  He has followed up with gastroenterology and had a PillCam.    Patient Active Problem List   Diagnosis   • Myelodysplasia, low grade   • CMML (chronic myelomonocytic leukemia)   • Thrombocytopenia   • Hypoxia    • Chronic fatigue   • Unintended weight loss   • Tobacco  "abuse   • FTT (failure to thrive) in adult   • Anemia   • H/O Pericardial effusion with cardiac tamponade   • Stage III Emphysema       Allergies   Allergen Reactions   • Penicillins        Current Outpatient Prescriptions:   •  aspirin 81 MG EC tablet, Take 81 mg by mouth daily., Disp: , Rfl:   •  cyclobenzaprine (FLEXERIL) 10 MG tablet, , Disp: , Rfl:   •  isosorbide mononitrate (IMDUR) 30 MG 24 hr tablet, , Disp: , Rfl:   •  Multiple Vitamins-Minerals (MULTIVITAMIN ADULT PO), Take 1 tablet by mouth daily., Disp: , Rfl:   •  omeprazole (priLOSEC) 20 MG capsule, , Disp: , Rfl:   •  PROAIR  (90 BASE) MCG/ACT inhaler, , Disp: , Rfl:   •  traMADol (ULTRAM) 50 MG tablet, , Disp: , Rfl:   MEDICATION LIST AND ALLERGIES REVIEWED.    Social History   Substance Use Topics   • Smoking status: Current Every Day Smoker     Packs/day: 1.00     Years: 60.00     Types: Cigarettes   • Smokeless tobacco: Former User   • Alcohol use No       FAMILY AND SOCIAL HISTORY REVIEWED.    Review of Systems   Constitutional: Positive for fatigue and unexpected weight change. Negative for chills and fever.   HENT: Negative for congestion, nosebleeds, postnasal drip, rhinorrhea, sinus pressure and trouble swallowing.    Respiratory: Positive for shortness of breath. Negative for cough, chest tightness and wheezing.    Cardiovascular: Negative for chest pain and leg swelling.   Gastrointestinal: Negative for abdominal pain, constipation, diarrhea, nausea and vomiting.   Genitourinary: Negative for dysuria, frequency, hematuria and urgency.   Musculoskeletal: Negative for myalgias.   Neurological: Negative for dizziness, weakness, numbness and headaches.   All other systems reviewed and are negative.  .    Temp 97.9 °F (36.6 °C)  Resp 16  Ht 72\" (182.9 cm)  Wt 123 lb (55.8 kg)  BMI 16.68 kg/m2   Weight on last visit, May 2017, 127 pounds    Physical Exam   Constitutional: He is oriented to person, place, and time.   Very thin, cachexia "   HENT:   Head: Normocephalic and atraumatic.   Eyes: EOM are normal. Pupils are equal, round, and reactive to light.   Neck: Normal range of motion. Neck supple.   Cardiovascular: Normal rate and regular rhythm.    No murmur heard.  Pulmonary/Chest: Effort normal. No respiratory distress. He has no wheezes. He has no rales.   Diminished, coarse bilaterally   Abdominal: Soft. Bowel sounds are normal. He exhibits no distension.   Musculoskeletal: Normal range of motion. He exhibits no edema.   Neurological: He is alert and oriented to person, place, and time.   Skin: No erythema.   Multiple bruising   Psychiatric: He has a normal mood and affect. His behavior is normal.   Vitals reviewed.        RESULTS    CT Chest 6/2/17:  Slight increase in bibasilar effusions, especially on the right, is  noted when compared to six weeks ago.      Minimal but new tree-in-bud opacities in the periphery of the posterior  segment of the right upper lobe.      Significant centrilobular emphysema and obstructive lung disease are  noted diffusely.      Moderate splenomegaly is identified.    CT Chest 4/20/17  1. Centrilobular emphysema and obstructive lung disease is noted with  postinflammatory micronodular scarring diffusely.  2. Otherwise, there is a trace effusion left base with mild linear  nodular stranding airspace opacity in the left lower lobe. There is mild  cardiomegaly without pericardial effusion. There is mild splenomegaly.  3. Overall, the active parenchymal disease at the left base is minimal,  however, global centrilobular emphysema and obstructive lung  abnormalities are impressive and diffuse.        ECHO 5/30/17  · Right ventricular cavity is mildly dilated.  · Left atrial cavity size is mild-to-moderately dilated.  · Mild mitral valve regurgitation is present.  · Mild to moderate tricuspid valve regurgitation is present.  · Left ventricular systolic function is normal. Estimated EF = 65%.  · There is a trivial  pericardial effusion adjacent to the left ventricle.  · Mild pulmonary hypertension is present. - RVSP 35-45  · The aortic valve exhibits sclerosis.    Lab Results   Component Value Date    WBC 54.2 (C) 06/19/2017    HGB 8.0 (L) 06/19/2017    HCT 28.7 (L) 06/19/2017    MCV 92 06/19/2017    PLT 61 (C) 06/19/2017             PROBLEM LIST    Problem List Items Addressed This Visit        Cardiovascular and Mediastinum    H/O Pericardial effusion with cardiac tamponade       Respiratory    Hypoxia     Stage III Emphysema - Primary       Digestive    FTT (failure to thrive) in adult       Hematopoietic and Hemostatic    CMML (chronic myelomonocytic leukemia)    Overview                   Other    Tobacco abuse            DISCUSSION    He continues to lose weight and is quite fatigued, he follows up with Dr. Austin as well as gastroneurology as per meter provider to help figure out why.    From pulmonary standpoint he is stable, he is doing well on the Stiolto and albuterol as needed.  I gave him some more samples in the office today.  His CT scan appears to be stable from 6 weeks ago.  With some inflammation of the right upper lobe, tree-in-bud opacity.  The pleural effusions are not large enough to tap.    He follows up with cardiology for his echocardiogram, which had no significant pericardial effusion on last follow-up.    As well as follow-up with Dr. Austin for his chronic leukemia.    Follow-up in 3 months or sooner as needed.  He plans to travel to [place] for the winter in November.    Farida Rodriguez, CJ  07/24/201712:51 PM  Electronically signed     Please note that portions of this note were completed with a voice recognition program. Efforts were made to edit the dictations, but occasionally words are mistranscribed.      CC: Angel Underwood MD

## 2017-07-26 PROBLEM — D46.20 MYELODYSPLASIA, LOW GRADE (HCC): Status: ACTIVE | Noted: 2017-01-01

## 2017-07-31 NOTE — PROGRESS NOTES
I have reviewed the notes, assessments, and/or procedures performed by Winston Hardwick, I concur with his documentation of Jerod Zelaya.

## 2017-08-02 NOTE — PROGRESS NOTES
"PROBLEM LIST:  1. CMML/MDS diagnosed in 11/2014.   2. Patient initiated on Vidaza at [facility] in [place], [place] in 12/2014.   3. Thrombocytopenia in the 70,000 range. At the time of diagnosis his  platelet count was around the same range.  4. Completed 6 cycles of Vidaza on 06/05/2015; has not been receiving Vidaza  for the last 6 months with stable platelet count.   5. Today his platelet count is 73,000. With some aggregation.  6. Cardiac tamponade - Apr 2017 - pericardiocentesis by Dr. Bass - Unknown cause  7. Severe COPD with RA sat of 78% - 98% on 2 L      REASON FOR VISIT: Shortness of breath    HISTORY OF PRESENT ILLNESS:   76-year-old gentleman with CMML presents today for follow-up.  He continues to require blood every month.  Otherwise still fairly fatigued and has some shortness of breath.  His activity level is fairly low.  Continues to be cachectic.  No infections in the last 6 weeks.      Past medical history, social history and family history was reviewed and unchanged from prior visit.    Review of Systems:    Review of Systems   Constitutional: Positive for fatigue.   HENT:  Negative.    Eyes: Negative.    Respiratory: Positive for shortness of breath.    Cardiovascular: Negative.    Gastrointestinal: Positive for blood in stool.   Endocrine: Negative.    Genitourinary: Negative.     Musculoskeletal: Negative.    Skin: Negative.    Neurological: Negative.    Hematological: Negative.    Psychiatric/Behavioral: Negative.       A comprehensive 14 point review of systems was performed and was negative except as mentioned.      Medications:  The current medication list was reviewed in the EMR    ALLERGIES:    Allergies   Allergen Reactions   • Penicillins          Physical Exam    VITAL SIGNS:  BP (!) 75/40  Pulse 77  Temp 98.4 °F (36.9 °C)  Resp 18  Ht 72\" (182.9 cm)  Wt 124 lb (56.2 kg)  BMI 16.82 kg/m2     Performance Status: 2    General: chronically ill " appearing, in no acute distress  HEENT: sclera anicteric, oropharynx clear, neck is supple  Lymphatics: no cervical, supraclavicular, or axillary adenopathy  Cardiovascular: regular rate and rhythm, no murmurs, rubs or gallops  Lungs: clear to auscultation bilaterally  Abdomen: soft, nontender, nondistended.  No palpable organomegaly  Extremities: no lower extremity edema  Skin: no rashes, lesions, or petechiae, + bruising noted  Msk:  Shows no weakness of the large muscle groups  Psych: Mood is stable        RECENT LABS:    Lab Results   Component Value Date    WBC 20.5 (C) 07/25/2017    HGB 7.0 (C) 07/25/2017    HCT 25.0 (L) 07/25/2017    MCV 93 07/25/2017    PLT 56 (C) 07/25/2017      Lab Results   Component Value Date    GLUCOSE 98 04/22/2017    BUN 17 04/22/2017    CREATININE 1.20 04/22/2017    EGFRIFNONA 59 (L) 04/22/2017    BCR 14.2 04/22/2017    CO2 23.0 04/22/2017    CALCIUM 8.9 04/22/2017    ALBUMIN 3.50 04/20/2017    LABIL2 1.1 (L) 04/20/2017    AST 48 (H) 04/20/2017    ALT 36 04/20/2017         Assessment/Plan    1.  CMML: Bone marrow biopsy shows stable disease.  His CBC reveals a elevated white blood cell count which is decreased to 20.  His platelet count is 56.  Options include restarting his Hypomethylating agent versus continuing supportive care only.  2.  Severe hypoxia likely due to COPD.    3.  Cardiac tamponade not due to pericardial effusion: Unknown cause.  Seems relatively stable..  4.  Failure to thrive: Still unclear about the cause of all his issues.  He has markedly worsened over the last year.   5. GI AVMs: Got Argon treatment with Dr. Hedrick.        Deniz Alves MD  [facility] Hematology and Oncology    8/2/2017         Please note that portions of this note may have been completed with a voice recognition program. Efforts were made to edit the dictations, but occasionally words are mistranscribed.

## 2017-08-30 NOTE — PROGRESS NOTES
"PROBLEM LIST:  1. CMML/MDS diagnosed in 11/2014.   2. Patient initiated on Vidaza at [facility] in [place], [place] in 12/2014.   3. Thrombocytopenia in the 70,000 range. At the time of diagnosis his  platelet count was around the same range.  4. Completed 6 cycles of Vidaza on 06/05/2015; has not been receiving Vidaza  for the last 6 months with stable platelet count.   5. Today his platelet count is 73,000. With some aggregation.  6. Cardiac tamponade - Apr 2017 - pericardiocentesis by Dr. Bass - Unknown cause  7. Severe COPD with RA sat of 78% - 98% on 2 L      REASON FOR VISIT: Shortness of breath    HISTORY OF PRESENT ILLNESS:   76-year-old gentleman with CMML presents today for follow-up.  He continues to require 2 units of blood once a  month.  Since I last saw him he is done reasonably well.  Still fairly fatigued does not do a whole lot.  I've asked him to continue some walking to help improve his stamina.    Past medical history, social history and family history was reviewed and unchanged from prior visit.    Review of Systems:    Review of Systems   Constitutional: Positive for fatigue.   HENT:  Negative.    Eyes: Negative.    Respiratory: Positive for shortness of breath.    Cardiovascular: Negative.    Gastrointestinal: Negative.    Endocrine: Negative.    Genitourinary: Negative.     Musculoskeletal: Negative.    Skin: Negative.    Neurological: Negative.    Hematological: Negative.    Psychiatric/Behavioral: Negative.       A comprehensive 14 point review of systems was performed and was negative except as mentioned.      Medications:  The current medication list was reviewed in the EMR    ALLERGIES:    Allergies   Allergen Reactions   • Penicillins          Physical Exam    VITAL SIGNS:  /54  Pulse 72  Temp 97.2 °F (36.2 °C)  Resp 20  Ht 72\" (182.9 cm)  Wt 122 lb (55.3 kg)  SpO2 (!) 89%  BMI 16.55 kg/m2     Performance Status: 2    General: chronically ill " appearing, in no acute distress  HEENT: sclera anicteric, oropharynx clear, neck is supple  Lymphatics: no cervical, supraclavicular, or axillary adenopathy  Cardiovascular: regular rate and rhythm, no murmurs, rubs or gallops  Lungs: clear to auscultation bilaterally  Abdomen: soft, nontender, nondistended.  No palpable organomegaly  Extremities: no lower extremity edema  Skin: no rashes, lesions, or petechiae, + bruising noted  Msk:  Shows no weakness of the large muscle groups  Psych: Mood is stable        RECENT LABS:    Lab Results   Component Value Date    WBC 24.9 (H) 08/15/2017    HGB 8.6 (L) 08/15/2017    HCT 29.0 (L) 08/15/2017    MCV 87 08/15/2017     (L) 08/15/2017           Assessment/Plan    1.  CMML: Bone marrow biopsy shows stable disease.  His platelet is 63 - I suspect there is some aggregation going on . Will continue transfusion support. He needs 2 units of blood this week due to HGB of 7.5  and worsening symptoms.    2.  Severe hypoxia likely due to COPD. Stable    3.  Cardiac tamponade not due to pericardial effusion: Unknown cause.    4.  Failure to thrive: Still unclear about the cause of all his issues.   More stable in the last month.   5.  GI AVMs: Got Argon treatment with Dr. Hedrick.        Deniz Alves MD  [facility] Hematology and Oncology    8/30/2017         Please note that portions of this note may have been completed with a voice recognition program. Efforts were made to edit the dictations, but occasionally words are mistranscribed.

## 2017-09-06 NOTE — PROGRESS NOTES
Steven KHAN to call [facility] and schedule blood transfusion for late this week or early next week.

## 2017-09-08 NOTE — TELEPHONE ENCOUNTER
Reported that Dr Austin wanted patient to have 2 units this week to help increase HGB.  Zay reported that he would call wife back and discuss.  If needed I can call wife.

## 2017-09-08 NOTE — TELEPHONE ENCOUNTER
----- Message from Rosanna Gray sent at 9/8/2017 12:42 PM EDT -----  Regarding: [facility] - BLOOD TRANSFUSION   Contact: [phone]  UMESH, WITH [facility] CALLED AND SAID FAMILY IS QUESTIONING WHY HE IS NEEDING A BLOOD TRANSFUSION, SINCE HE RECEIVED BLOOD LAST WEEK.

## 2017-10-11 NOTE — PROGRESS NOTES
"PROBLEM LIST:  1. CMML/MDS diagnosed in 11/2014.   2. Patient initiated on Vidaza at [facility] in [place], [place] in 12/2014.   3. Thrombocytopenia in the 70,000 range. At the time of diagnosis his  platelet count was around the same range.  4. Completed 6 cycles of Vidaza on 06/05/2015; has not been receiving Vidaza  for the last 6 months with stable platelet count.   5. Today his platelet count is 73,000. With some aggregation.  6. Cardiac tamponade - Apr 2017 - pericardiocentesis by Dr. Bass - Unknown cause  7. Severe COPD with RA sat of 78% - 98% on 2 L      REASON FOR VISIT: Shortness of breath    HISTORY OF PRESENT ILLNESS:   77-year-old gentleman with CMML presents today for follow-up.  He continues to require 2 units of blood once a  month.   His last transfusion was on 9/6/2017.  Over the last 6 months he is gotten progressively weaker.  He is developed a pressure sore and that has been healing.  Otherwise still continues to smoke.  No recent infections.  He is moving to [place] at the end of the month.    Past medical history, social history and family history was reviewed and unchanged from prior visit.    Review of Systems:    Review of Systems   Constitutional: Positive for fatigue.   HENT:  Negative.    Eyes: Negative.    Respiratory: Positive for shortness of breath.    Cardiovascular: Negative.    Gastrointestinal: Negative.    Endocrine: Negative.    Genitourinary: Negative.     Skin: Negative.    Neurological: Positive for extremity weakness.   Hematological: Negative.    Psychiatric/Behavioral: Negative.       A comprehensive 14 point review of systems was performed and was negative except as mentioned.      Medications:  The current medication list was reviewed in the EMR    ALLERGIES:    Allergies   Allergen Reactions   • Penicillins          Physical Exam    VITAL SIGNS:  BP 90/56  Pulse 85  Temp 98.1 °F (36.7 °C)  Resp 24  Ht 72\" (182.9 cm)  Wt 123 lb (55.8 " kg)  SpO2 (!) 79%  BMI 16.68 kg/m2     Performance Status: 2    General: chronically ill appearing, in no acute distress  HEENT: sclera anicteric, oropharynx clear, neck is supple  Lymphatics: no cervical, supraclavicular, or axillary adenopathy  Cardiovascular: regular rate and rhythm, no murmurs, rubs or gallops  Lungs: clear to auscultation bilaterally  Abdomen: soft, nontender, nondistended.  No palpable organomegaly  Extremities: no lower extremity edema  Skin: no rashes, lesions, or petechiae, + bruising noted  Msk:  Shows no weakness of the large muscle groups  Psych: Mood is stable        RECENT LABS:    Lab Results   Component Value Date    HGB 8.9 (L) 10/09/2017    HCT 28.9 (L) 10/09/2017    MCV 87 10/09/2017    PLT 74 (L) 10/09/2017    WBC 6.4 10/09/2017    NEUTROABS 2.4 10/09/2017    LYMPHSABS 2.4 10/09/2017    MONOSABS 1.1 (H) 10/09/2017    EOSABS 0.0 10/09/2017    BASOSABS 0.0 10/09/2017        Component       WBC Hemoglobin   Latest Ref Rng & Units       3.4 - 10.8 x10E3/uL 12.6 - 17.7 g/dL   9/9/2014      12:41 PM 17.59 (H) 14.8   9/9/2014      1:11 PM  12.9   4/19/2017       27.31 (H) 6.4 (L)   4/20/2017      10:49 AM 27.55 (H) 8.9 (L)   4/20/2017      6:18 PM  8.5 (L)   4/21/2017       28.46 (H) 9.7 (L)   4/22/2017       25.06 (H) 9.6 (L)   4/24/2017       35.04 (H) 9.7 (L)   5/16/2017       99.38 (H) 6.6 (L)   6/5/2017       72.1 (HH) 7.9 (L)   6/13/2017       55.2 (HH) 8.0 (L)   6/19/2017       54.2 (HH) 8.0 (L)   7/12/2017       36.6 (HH) 7.2 (L)   7/25/2017       20.5 (HH) 7.0 (LL)   8/15/2017       24.9 (H) 8.6 (L)   8/28/2017       22.3 (H) 7.5 (L)   9/25/2017       6.2 9.5 (L)   10/9/2017       6.4 8.9 (L)     Component       Hematocrit MCV MCH MCHC   Latest Ref Rng & Units       37.5 - 51.0 % 79 - 97 fL 26.6 - 33.0 pg 31.5 - 35.7 g/dL   9/9/2014      12:41 PM 44.6 86.1 28.6 33.2   9/9/2014      1:11 PM 38.0      4/19/2017       22.1 (L) 95.3 27.6 29.0 (L)   4/20/2017      10:49 AM 29.1  (L) 91.5 28.0 30.6 (L)   4/20/2017      6:18 PM 28.2 (L)      4/21/2017       32.9 (L) 94.3 27.8 29.5 (L)   4/22/2017       31.8 (L) 92.7 28.0 30.2 (L)   4/24/2017       33.3 (L) 93.5 27.2 29.1 (L)   5/16/2017       23.6 (L) 97.5 27.3 28.0 (L)   6/5/2017       28.1 (L) 94 26.4 (L) 28.1 (L)   6/13/2017       28.6 (L) 91 25.6 (L) 28.0 (L)   6/19/2017       28.7 (L) 92 25.6 (L) 27.9 (L)   7/12/2017       24.9 (L) 97 27.9 28.9 (L)   7/25/2017       25.0 (L) 93 26.0 (L) 28.0 (L)   8/15/2017       29.0 (L) 87 25.9 (L) 29.7 (L)   8/28/2017       25.4 (L) 89 26.1 (L) 29.5 (L)   9/25/2017       30.8 (L) 88 27.1 30.8 (L)   10/9/2017       28.9 (L) 87 26.9 30.8 (L)     Component       Platelets   Latest Ref Rng & Units       150 - 379 x10E3/uL   9/9/2014      12:41  (L)   9/9/2014      1:11 PM    4/19/2017       209   4/20/2017      10:49    4/20/2017      6:18 PM    4/21/2017       162   4/22/2017       167   4/24/2017       133 (L)   5/16/2017       127 (L)   6/5/2017       117 (L)   6/13/2017       75 (LL)   6/19/2017       61 (LL)   7/12/2017       60 (LL)   7/25/2017       56 (LL)   8/15/2017       145 (L)   8/28/2017       63 (L)   9/25/2017       68 (L)   10/9/2017       74 (L)     Assessment/Plan    1.  CMML: Bone marrow biopsy shows stable disease.  His platelet is 74 - I suspect there is some aggregation going on . Will continue transfusion support. Check epo and testosterone to see if replacement will help with mitigating his transfusion needs..    He did one year of Vidaza without significant improvement in his levels.   2.  Severe hypoxia likely due to COPD. Stable    3.  Cardiac tamponade not due to pericardial effusion: Unknown cause.    4.  Failure to thrive: Still unclear about the cause of all his issues.   More stable in the last month.   5.  GI AVMs: Got Argon treatment with Dr. Hedrick.        Deniz Alves MD  [facility] Hematology and Oncology    10/11/2017         Please note  that portions of this note may have been completed with a voice recognition program. Efforts were made to edit the dictations, but occasionally words are mistranscribed.

## 2017-10-18 NOTE — TELEPHONE ENCOUNTER
Left message that Dr Austin wants to start patient on procrit injections for CLL.  Steven KHAN to schedule and call patient with date and time.

## (undated) DEVICE — SUT SILK 0 SH 30IN K834H

## (undated) DEVICE — OASIS DRAIN, SINGLE, INLINE & ATS COMPATIBLE: Brand: OASIS

## (undated) DEVICE — 3M™ IOBAN™ 2 ANTIMICROBIAL INCISE DRAPE 6650EZ: Brand: IOBAN™ 2

## (undated) DEVICE — SPNG GZ WOVN 4X4IN 12PLY 10/BX STRL

## (undated) DEVICE — SUT SILK 3/0 TIES 18IN A184H

## (undated) DEVICE — TRAP,MUCUS SPECIMEN,40CC: Brand: MEDLINE

## (undated) DEVICE — SUT MNCRYL 3/0 SH 27 IN Y416H

## (undated) DEVICE — 2000CC GUARDIAN II: Brand: GUARDIAN

## (undated) DEVICE — PK THORACOTOMY 10

## (undated) DEVICE — UNDYED BRAIDED (POLYGLACTIN 910), SYNTHETIC ABSORBABLE SUTURE: Brand: COATED VICRYL

## (undated) DEVICE — SUT SILK 2/0 TIES 18IN A185H

## (undated) DEVICE — GLV SURG SENSICARE W/ALOE PF LF 7 STRL